# Patient Record
Sex: MALE | Race: WHITE | Employment: OTHER | URBAN - METROPOLITAN AREA
[De-identification: names, ages, dates, MRNs, and addresses within clinical notes are randomized per-mention and may not be internally consistent; named-entity substitution may affect disease eponyms.]

---

## 2018-06-05 PROBLEM — R91.8 LUNG MASS: Status: ACTIVE | Noted: 2018-06-05

## 2018-06-07 ENCOUNTER — APPOINTMENT (OUTPATIENT)
Dept: GENERAL RADIOLOGY | Age: 73
End: 2018-06-07
Attending: INTERNAL MEDICINE
Payer: MEDICARE

## 2018-06-07 ENCOUNTER — HOSPITAL ENCOUNTER (OUTPATIENT)
Age: 73
Setting detail: OUTPATIENT SURGERY
Discharge: HOME | End: 2018-06-07
Attending: INTERNAL MEDICINE | Admitting: INTERNAL MEDICINE
Payer: MEDICARE

## 2018-06-07 VITALS
WEIGHT: 158 LBS | TEMPERATURE: 97.9 F | BODY MASS INDEX: 24.8 KG/M2 | DIASTOLIC BLOOD PRESSURE: 80 MMHG | OXYGEN SATURATION: 94 % | HEART RATE: 77 BPM | RESPIRATION RATE: 18 BRPM | SYSTOLIC BLOOD PRESSURE: 138 MMHG | HEIGHT: 67 IN

## 2018-06-07 DIAGNOSIS — R91.8 LUNG MASS: ICD-10-CM

## 2018-06-07 PROCEDURE — 77030012699 HC VLV SUC CNTRL OCOA -A: Performed by: INTERNAL MEDICINE

## 2018-06-07 PROCEDURE — 88112 CYTOPATH CELL ENHANCE TECH: CPT | Performed by: INTERNAL MEDICINE

## 2018-06-07 PROCEDURE — 77030021922 HC FCPS BIOP SD DISP SPDM -D: Performed by: INTERNAL MEDICINE

## 2018-06-07 PROCEDURE — 77030003406 HC NDL ASPIR BIOP OCOA -C: Performed by: INTERNAL MEDICINE

## 2018-06-07 PROCEDURE — 74011250636 HC RX REV CODE- 250/636: Performed by: INTERNAL MEDICINE

## 2018-06-07 PROCEDURE — 31627 NAVIGATIONAL BRONCHOSCOPY: CPT | Performed by: INTERNAL MEDICINE

## 2018-06-07 PROCEDURE — 88305 TISSUE EXAM BY PATHOLOGIST: CPT | Performed by: INTERNAL MEDICINE

## 2018-06-07 PROCEDURE — 77030028571 HC CATH ENDOBRNCH DISP BIOP KT SPMD -G1: Performed by: INTERNAL MEDICINE

## 2018-06-07 PROCEDURE — 77030031420 HC NDL TIP BRSH ASP SD SPDM -B: Performed by: INTERNAL MEDICINE

## 2018-06-07 PROCEDURE — 88185 FLOWCYTOMETRY/TC ADD-ON: CPT | Performed by: INTERNAL MEDICINE

## 2018-06-07 PROCEDURE — 31628 BRONCHOSCOPY/LUNG BX EACH: CPT | Performed by: INTERNAL MEDICINE

## 2018-06-07 PROCEDURE — 99152 MOD SED SAME PHYS/QHP 5/>YRS: CPT | Performed by: INTERNAL MEDICINE

## 2018-06-07 PROCEDURE — 88184 FLOWCYTOMETRY/ TC 1 MARKER: CPT | Performed by: INTERNAL MEDICINE

## 2018-06-07 PROCEDURE — 31654 BRONCH EBUS IVNTJ PERPH LES: CPT | Performed by: INTERNAL MEDICINE

## 2018-06-07 PROCEDURE — 77030009046 HC CATH BRNCH BLLN OCOA -B: Performed by: INTERNAL MEDICINE

## 2018-06-07 PROCEDURE — 74011250636 HC RX REV CODE- 250/636

## 2018-06-07 PROCEDURE — 76040000026: Performed by: INTERNAL MEDICINE

## 2018-06-07 PROCEDURE — 77030031404 HC PTCH SENS SD DISP SPDM -A: Performed by: INTERNAL MEDICINE

## 2018-06-07 PROCEDURE — 99153 MOD SED SAME PHYS/QHP EA: CPT | Performed by: INTERNAL MEDICINE

## 2018-06-07 RX ORDER — SODIUM CHLORIDE, SODIUM LACTATE, POTASSIUM CHLORIDE, CALCIUM CHLORIDE 600; 310; 30; 20 MG/100ML; MG/100ML; MG/100ML; MG/100ML
1000 INJECTION, SOLUTION INTRAVENOUS CONTINUOUS
Status: DISCONTINUED | OUTPATIENT
Start: 2018-06-07 | End: 2018-06-07 | Stop reason: HOSPADM

## 2018-06-07 RX ORDER — FENTANYL CITRATE 50 UG/ML
50 INJECTION, SOLUTION INTRAMUSCULAR; INTRAVENOUS
Status: DISCONTINUED | OUTPATIENT
Start: 2018-06-07 | End: 2018-06-07 | Stop reason: HOSPADM

## 2018-06-07 RX ORDER — MIDAZOLAM HYDROCHLORIDE 1 MG/ML
.25-5 INJECTION, SOLUTION INTRAMUSCULAR; INTRAVENOUS
Status: DISCONTINUED | OUTPATIENT
Start: 2018-06-07 | End: 2018-06-07 | Stop reason: HOSPADM

## 2018-06-07 RX ORDER — NALOXONE HYDROCHLORIDE 0.4 MG/ML
0.4 INJECTION, SOLUTION INTRAMUSCULAR; INTRAVENOUS; SUBCUTANEOUS
Status: DISCONTINUED | OUTPATIENT
Start: 2018-06-07 | End: 2018-06-07 | Stop reason: HOSPADM

## 2018-06-07 RX ORDER — FLUMAZENIL 0.1 MG/ML
0.2 INJECTION INTRAVENOUS
Status: DISCONTINUED | OUTPATIENT
Start: 2018-06-07 | End: 2018-06-07 | Stop reason: HOSPADM

## 2018-06-07 RX ORDER — SODIUM CHLORIDE 0.9 % (FLUSH) 0.9 %
5-10 SYRINGE (ML) INJECTION AS NEEDED
Status: DISCONTINUED | OUTPATIENT
Start: 2018-06-07 | End: 2018-06-07 | Stop reason: HOSPADM

## 2018-06-07 RX ORDER — SODIUM CHLORIDE 0.9 % (FLUSH) 0.9 %
5-10 SYRINGE (ML) INJECTION EVERY 8 HOURS
Status: DISCONTINUED | OUTPATIENT
Start: 2018-06-07 | End: 2018-06-07 | Stop reason: HOSPADM

## 2018-06-07 RX ADMIN — FENTANYL CITRATE 50 MCG: 50 INJECTION, SOLUTION INTRAMUSCULAR; INTRAVENOUS at 14:40

## 2018-06-07 RX ADMIN — MIDAZOLAM HYDROCHLORIDE 2 MG: 1 INJECTION, SOLUTION INTRAMUSCULAR; INTRAVENOUS at 14:39

## 2018-06-07 RX ADMIN — FENTANYL CITRATE 50 MCG: 50 INJECTION, SOLUTION INTRAMUSCULAR; INTRAVENOUS at 14:49

## 2018-06-07 RX ADMIN — SODIUM CHLORIDE, SODIUM LACTATE, POTASSIUM CHLORIDE, AND CALCIUM CHLORIDE 1000 ML: 600; 310; 30; 20 INJECTION, SOLUTION INTRAVENOUS at 12:56

## 2018-06-07 NOTE — PROCEDURES
PROCEDURE  Bronchoscopy with Endobronchial Ultrasound Guided Fine Needle Aspiration Of Medistinal Lymph nodes and airway inspection and EMN aided biopsy of peripheral lung nodule. EQUIPMENT:  Olympus T180 bronchoscope, Super Dimension EMN system, 45 deg steareble sheath, Olympus [id number] EBUS scope, UM 17 Radial probe, Super D forceps, Fluoroscopy. INDICATION   Peripheral nodule/mass suspicious for malignancy/staging of presumed malignancy        POST OP DIAGNOSIS:  Super Dimension EMN system was employed to identify and biopsy the LLL nodule/mass seen on CT.  6 biopsies were obtained and evaluated via touch prep and TRIPP. Touch preps revealed malignant cells  IHC pending. Histology from obtained tissue is currently pending. BAL was performed and specimen sent for flow cytometry  There were no targets for EBUS    Based on CT chest/and EBUS pt is a STAGE [T1c,N0,Mx] IA3 as of now   Await final path and IHC    Patient will need CPFT and whole body PET CT and will be presented in MDTTB. He should be a good candidate for a lobectomy and lymphadenectomy. ANESTHESIA  Concious sedation with: Fentanyl 100 mcg IV; Versed 2 mg IV; Lidocaine 200 mg to tracheo-bronchial tree and vocal cords. AIRWAY INSPECTION  After obtaining informed consent, using a bite block, an Olympus T 180 viedeo bronchoscope was  introduced into the trachea through the vocal cords without complication.     RIGHT  LOCATION NORM/ABNORMAL DESCRIPTION   VOCAL CORDS NL    TRACHEA NL    ELVIA NL    RMSB NL    RUL NL    BI NL    RML NL    SUP SEGM RLL NL    MED BASAL NL    ANTERIOR BASAL NL    LATERAL BASAL NL    POSTERIOR BASAL NL                  LEFT  LOCATION NORMAL/ABNORMAL TYPE   LMSB NL    YANIV NL    LINGULA NL    SUPERIOR DIVISION NL    SUPERIOR SEG LLL NL    RUBY-MEDIAL LLL NL    LATERAL LLL NL    POSTERIOR LLL NL                         EBUS  After completing the airway inspection an Olympus  F EBUS bronchoscope was introduced into the trachea through the vocal chords without complication. The balloon was inflated with saline and a mediastinal inspection commenced:      STATION SIZE IN CM   11R sup No tgt   11R inf No tgt   10R No tgt   7 No tgt   4R No tgt   11L No tgt   10L No tgt   4L 0.1/0.2   2L No tgt   2R No tgt         There were no targets for biopsy        © 2018 UpToDate, Inc. and/or its affiliates. All Rights Reserved. T, N, and M descriptors for the eighth edition of TNM classification for lung cancer    T: Primary tumor   Tx Primary tumor cannot be assessed or tumor proven by presence of malignant cells in sputum or bronchial washings but not visualized by imaging or bronchoscopy   T0 No evidence of primary tumor   Tis Carcinoma in situ   T1 Tumor ? 3 cm in greatest dimension surrounded by lung or visceral pleura without bronchoscopic evidence of invasion more proximal than the lobar bronchus (ie, not in the main bronchus)*   T1a(mi) Minimally invasive adenocarcinoma¶   T1a Tumor ? 1 cm in greatest dimension*   T1b Tumor >1 cm but ?2 cm in greatest dimension*   T1c Tumor >2 cm but ?3 cm in greatest dimension*   T2 Tumor >3 cm but ?5 cm or tumor with any of the following features:?  · Involves main bronchus regardless of distance from the valarie but without involvement of the valarie  · Invades visceral pleura  · Associated with atelectasis or obstructive pneumonitis that extends to the hilar region, involving part or all of the lung   T2a Tumor >3 cm but ?4 cm in greatest dimension   T2b Tumor >4 cm but ?5 cm in greatest dimension   T3 Tumor >5 cm but ?7 cm in greatest dimension or associated with separate tumor nodule(s) in the same lobe as the primary tumor or directly invades any of the following structures: chest wall (including the parietal pleura and superior sulcus tumors), phrenic nerve, parietal pericardium   T4 Tumor >7 cm in greatest dimension or associated with separate tumor nodule(s) in a different ipsilateral lobe than that of the primary tumor or invades any of the following structures: diaphragm, mediastinum, heart, great vessels, trachea, recurrent laryngeal nerve, esophagus, vertebral body, and valarie   N: Regional lymph node involvement   Nx Regional lymph nodes cannot be assessed   N0 No regional lymph node metastasis   N1 Metastasis in ipsilateral peribronchial and/or ipsilateral hilar lymph nodes and intrapulmonary nodes, including involvement by direct extension   N2 Metastasis in ipsilateral mediastinal and/or subcarinal lymph node(s)   N3 Metastasis in contralateral mediastinal, contralateral hilar, ipsilateral or contralateral scalene, or supraclavicular lymph node(s)   M: Distant metastasis   M0 No distant metastasis   M1 Distant metastasis present   M1a Separate tumor nodule(s) in a contralateral lobe; tumor with pleural or pericardial nodule(s) or malignant pleural or pericardial effusion? M1b Single extrathoracic metastasis§   M1c Multiple extrathoracic metastases in one or more organs   Stage groupings   Occult carcinoma TX N0 M0   Stage 0 Tis N0 M0   Stage IA1 T1a(mi) N0 M0    T1a N0 M0   Stage IA2 T1b N0 M0   Stage IA3 T1c N0 M0   Stage IB T2a N0 M0   Stage IIA T2b N0 M0   Stage IIB T1a to c N1 M0    T2a N1 M0    T2b N1 M0    T3 N0 M0   Stage IIIA T1a to c N2 M0    T2a to b N2 M0    T3 N1 M0    T4 N0 M0    T4 N1 M0   Stage IIIB T1a to c N3 M0    T2a to b N3 M0    T3 N2 M0    T4 N2 M0   Stage IIIC T3 N3 M0    T4 N3 M0   Stage SOFÍA Any T Any N M1a    Any T Any N M1b   Stage IVB Any T Any N M1c   NOTE: Changes to the seventh edition are in bold. TNM: tumor, node, metastasis; Tis: carcinoma in situ; T1a(mi): minimally invasive adenocarcinoma. * The uncommon superficial spreading tumor of any size with its invasive component limited to the bronchial wall, which may extend proximal to the main bronchus, is also classified as T1a.   ¶ Solitary adenocarcinoma, ?3 cm with a predominately lepidic pattern and ?5 mm invasion in any one focus. ? T2 tumors with these features are classified as T2a if ?4 cm in greatest dimension or if size cannot be determined, and T2b if >4 cm but ?5 cm in greatest dimension. ? Most pleural (pericardial) effusions with lung cancer are due to tumor. In a few patients, however, multiple microscopic examinations of pleural (pericardial) fluid are negative for tumor and the fluid is nonbloody and not an exudate. When these elements and clinical judgment dictate that the effusion is not related to the tumor, the effusion should be excluded as a staging descriptor. § This includes involvement of a single distant (nonregional) lymph node. Reproduced from: Mateus Chandra et al. The IASLC Lung Cancer Staging Project: Proposals for Revision of the TNM Stage Groupings in the Forthcoming (Eighth) Edition of the TNM Classification for Lung Cancer. J Thorac Oncol 2016; 11:39. Table used with the permission of [employer]. All rights reserved. Graphic 727086 Version 1.0                                         Olympus T 180   bronchoscope wasintroduced into the airways to identify and biopsy the LLL mass/nodule with the aid of Super D EMN system and radial probe US. CT image was acquired on with Super D protocol were used and the pathway to target  planned using super D planning software. Planing data was then used to navigate to the nodule, after verification with radial US:    6 biopsies were obtained and evaluated via touch prep and TRIPP. Touch preps revealed malignant cells  IHC pending. Histology from obtained tissue is currently pending.   BAL was performed and specimen sent for flow cytometry  TOUCH PREP BIOPSY# MALIGNANT SUSPICIOUS ATYPIA NONDGNSTC   LLL forceps biopsy 1 ++++ - -     2 ++++ - -     3 ++++ - -     4 ++++ - -     5 - - - BE and blood    6 - - - BE and blood     EBL: 5 ml    Complications: NONE with no evidence of pneumothorax on post-op flouroscopy.     Elizabeth Lux MD.

## 2018-06-07 NOTE — H&P (VIEW-ONLY)
[address] [facility], [address] [facility]  [phone]    Patient Name:  Erik Sotomayor  YOB: 1945      Office Visit 6/5/2018    CHIEF COMPLAINT:    Chief Complaint   Patient presents with    Lung Mass       HISTORY OF PRESENT ILLNESS:    A very pleasant 82-year-old presents for incidentally found 3 cm lung mass in the left lower lobe identified during calcium scoring CT scan of the chest.  He is completely asymptomatic. He used to smoke a pack per day for 1 year exactly and quit in 1970. He does not have any family history of lung cancer and study has a history of prostate cancer in his family but no personal history of cancer in the past 5 years. He is a  currently retired. Denies any hemoptysis, unintentional weight loss, chest pain. After his calcium scoring CT scan was abnormal he was scheduled for a full chest CT scan which revealed no mediastinal adenopathy but a lobulated 3 cm left lower lobe mass for which he is presenting here. Otherwise relatively healthy, does not have any chronic problems he takes an aspirin a day and denies any medical problems chronically. No past medical history on file. Problem List  Never Reviewed          Codes Class Noted    Lung mass ICD-10-CM: R91.8  ICD-9-CM: 786.6  6/5/2018                No past surgical history on file. Social History     Social History    Marital status: UNKNOWN     Spouse name: N/A    Number of children: N/A    Years of education: N/A     Occupational History    Not on file.      Social History Main Topics    Smoking status: Former Smoker     Packs/day: 0.25     Years: 1.00     Types: Cigarettes     Quit date: 1/1/1968    Smokeless tobacco: Never Used    Alcohol use Not on file    Drug use: Not on file    Sexual activity: Not on file     Other Topics Concern    Not on file     Social History Narrative    No narrative on file         No family history on file. Allergies   Allergen Reactions    Oxycodone-Acetaminophen Itching    Sulfa (Sulfonamide Antibiotics) Itching    Sulfasalazine Other (comments)    Tyloxapol Other (comments)         Current Outpatient Prescriptions   Medication Sig    simvastatin (ZOCOR) 40 mg tablet Take  by mouth nightly.  dutasteride (AVODART) 0.5 mg capsule Take 0.5 mg by mouth daily.  ubidecarenone/vitamin E mixed (COQ10  PO) Take 1 Tab by mouth daily.  Omega-3 Fatty Acids 60- mg cpDR Take 1 Tab by mouth daily.  alfuzosin SR (UROXATRAL) 10 mg SR tablet Take 10 mg by mouth daily.  folic acid (FOLVITE) 1 mg tablet Take  by mouth daily.  cholecalciferol, vitamin D3, (VITAMIN D3) 2,000 unit tab Take 1 Tab by mouth daily.  aspirin delayed-release 81 mg tablet Take 81 mg by mouth daily.  cpap machine kit     doxepin (SINEQUAN) 10 mg/mL solution Take 10 mg by mouth nightly. No current facility-administered medications for this visit.             REVIEW OF SYSTEMS:       General:   Negative for unexplained weight loss / Weight loss / Weight gain / Fever / Chills / Fatigue / Night sweats    Eyes:Dryness  Negative for Vision loss / Blurred vision / Double vision / Pain / Redness /       Ears: Ringing  Negative for Hearing loss / / Pain    Nose:Postnasal drainage  / Nasal congestion  Negative for / Bleeding    Mouth/Throat: Snoring  Negative for Sore throat / Uclers / Bleeding gums / Hoarseness /    Neck:  Negative for Neck stiffness / Pain / Goiter / Mass     Respiratory: Sleep apnea  Negative for Shortness of breath / Cough / Wheezing / Coughing up blood /    Cardiovascular:  Negative for Chest pain / Palpitations / Sleep on multiple pillows / Sudden shortness of breath during sleep / History of heart murmur / Passing out / Swelling in legs or feet    Gastrointestinal:  Negative for Indigestion / Reflux / Nausea / Vomiting / Diarrhea / Constipation / Difficulty swallowing / Choking with eating or drining / Blood in stools / Loss of appetite / Abdominal pain    Genitourinary:Frequent urination / Frequent urination at night  Negative for Pain with urination /  / Blood in urine    Musculoskeletal: Back pain  Negative for Pain in joints / Pain in muscles / Weakness in arms or legs /    Neurological:  Negative for Seizures / Severe headaches / Dizziness / Numbness / Tremors / Memory loss / Morning headaches    Psychiatric:Anxiety  Negative for  / Depression / Suicidal thoughts or plans / Hallucinations    Allergies:Seasonal allergies itchy eyes / Nasal congestion or drainage    Negative for  / Watery  Skin:  Negative for Rash / Lesions / Itching / Hair loss / Dry skin    Hematology/ Lymph:  Negative for Bruise easily / Anemia / Swollen or tender glands / Blood clots       PHYSICAL EXAM:    Visit Vitals    /80 (BP 1 Location: Left arm, BP Patient Position: Sitting)    Pulse 76    Temp 98 °F (36.7 °C)    Resp 20    Ht 5' 6.5\" (1.689 m)    Wt 158 lb (71.7 kg)    SpO2 100%    BMI 25.12 kg/m2        GENERAL APPEARANCE:   The patient is normal weight and in no respiratory distress. HEENT:   PERRL. Conjunctivae unremarkable. Nasal mucosa is without epistaxis, exudate, or polyps. Gums and dentition are unremarkable. There is no oropharyngeal narrowing. TMs are clear. NECK/LYMPHATIC:   Symmetrical with no elevation of jugular venous pulsation. Trachea midline. No thyroid enlargement. No cervical adenopathy. LUNGS:   Normal respiratory effort with symmetrical lung expansion. Breath sounds clear to auscultation bilaterally with no rhonchi wheezing or crackles. HEART:   There is a regular rate and rhythm. No murmur, rub, or gallop. There is no edema in the lower extremities. ABDOMEN:   Soft and non-tender. No hepatosplenomegaly. Bowel sounds are normal.     SKIN:   There are no rashes, cyanosis, jaundice, or ecchymosis present.    EXTREMITIES:   The extremities are unremarkable without clubbing, cyanosis, joint inflammation, degenerative, or ischemic change. MUSCULOSKELETAL:   There is no abnormal tone, muscle atrophy, or abnormal movement present. NEURO:   The patient is alert and oriented to person, place, and time. Memory appears intact and mood is normal.  No gross sensorimotor deficits are present. DIAGNOSTIC TESTS:   CT of chest:           Spirometry:  6/5/2018          ASSESSMENT:  (Medical Decision Making)       Patient Active Problem List   Diagnosis Code    Lung mass R91.8           PLAN:  Encounter Diagnosis   Name Primary?  Lung mass Yes   Patient has a 3 cm lung mass in the left lower lobe, he is a non-smoker and only smoked for 1 year in the 1970. He has no stigmata of lung cancer the nodule is lobulated nevertheless using the spiculated pulmonary nodule calculator for risk of lung cancer he comes out to have a half of a risk of estimated 43%. I discussed this with him into length. I suspect that because there is growth, the patient had a calcium scoring CT scan of the chest 3 years ago at which point these abnormality was not noted. Therefore the apparent 3 cm mass has grown over the past 3 years. Given this I think it is reasonable to pursue biopsy for diagnosis. I discussed 3 options for biopsy including video-assisted thoracoscopic surgery with diagnostic wedge followed by completion lobectomy if cancer identified, CT-guided biopsy as well as navigation bronchoscopy with the bronchial guided fine-needle aspiration of mediastinal lymph nodes for mediastinal inspection and staging. Risks and benefits of the procedures were described to the patient and he finally opted on pursuing navigation bronchoscopy with concomitant endobronchial ultrasound-guided fine-needle aspiration and mediastinal staging at the same time.   He will be scheduled first available for the procedure, if we obtain a diagnosis of cancer than he would be a candidate for lobectomy with lymphadenectomy as he has normal lung function. To confirm this we will obtain complete pulmonary function with diffusion capacity for completeness sake. If the biopsy is negative then his case will be discussed in multidisciplinary thoracic tumor board perhaps a PET scan will be performed to determine whether PET positivity is present in the area to aid us in deciding whether to pursue surgical intervention or not. Any questions asked were answered seemingly to his and his wife's satisfaction who accompanied him to today's procedure. We will schedule tentatively navigation bronchoscopy for 14 June with Dr. Ayla Peters. The office will be determined after results of procedure. Orders Placed This Encounter    AMB POC SPIROMETRY    AMB POC PLETHYSMOGRAPHY LUNG VOLUMES W/WO AIRWAY RESIST ([id number])    AMB POC SPIROMETRY W/BRONCHODILATOR ([id number])    AMB POC SPIROMETRY ([id number])    AMB POC DIFFUSING CAPACITY ([id number])    AMB POC GAS DILUTION(LUNG VOLUMES) ([id number])    INHAL RX, AIRWAY OBST/DX SPUTUM INDUCT ([id number])            Braulio Paulino MD    Dictated using voice recognition software.  Proofread, but unrecognized voice recognition errors may exist.

## 2018-06-07 NOTE — IP AVS SNAPSHOT
[address] 
[phone] Patient: Carmen Lira MRN: [id number] :1945 About your hospitalization You were admitted on:  2018 You last received care in the:  [facility] You were discharged on:  2018 Why you were hospitalized Your primary diagnosis was:  Not on File Follow-up Information None Discharge Orders None A check tanna indicates which time of day the medication should be taken. My Medications ASK your doctor about these medications Instructions Each Dose to Equal  
 Morning Noon Evening Bedtime  
 alfuzosin SR 10 mg SR tablet Commonly known as:  Justinkarmen Torres Your last dose was: Your next dose is: Take 10 mg by mouth daily. 10 mg  
    
   
   
   
  
 aspirin delayed-release 81 mg tablet Your last dose was: Your next dose is: Take 81 mg by mouth daily. 81 mg  
    
   
   
   
  
 COQ10  PO Your last dose was: Your next dose is: Take 1 Tab by mouth daily. 1 Tab  
    
   
   
   
  
 cpap machine kit Your last dose was: Your next dose is:    
   
   
      
   
   
   
  
 doxepin 10 mg/mL solution Commonly known as:  SINEQUAN Your last dose was: Your next dose is: Take 10 mg by mouth nightly. 10 mg  
    
   
   
   
  
 dutasteride 0.5 mg capsule Commonly known as:  AVODART Your last dose was: Your next dose is: Take 0.5 mg by mouth daily. 0.5 mg  
    
   
   
   
  
 folic acid 1 mg tablet Commonly known as:  [employer] Your last dose was: Your next dose is: Take  by mouth daily. Omega-3 Fatty Acids 60- mg Cpdr  
   
Your last dose was: Your next dose is: Take 1 Tab by mouth daily. 1 Tab  
    
   
   
   
  
 simvastatin 40 mg tablet Commonly known as:  ZOCOR Your last dose was: Your next dose is: Take  by mouth nightly. VITAMIN D3 2,000 unit Tab Generic drug:  cholecalciferol (vitamin D3) Your last dose was: Your next dose is: Take 1 Tab by mouth daily. 1 Tab Discharge Instructions RESPIRATORY CARE - BRONCHOSCOPY - DISCHARGE INSTRUCTIONS You received a lot of numbing medication for your throat and nose, and you also received medication to make you sleepy during your procedure. Because of this and because the bronchoscopy may have irritated your airways, we ask that you follow these directions: 1. Do not eat or drink until  4:30pm .   After that, you may have what you please. You may want to try some liquids first, because your throat may be a little sore. 2. Medication may cause drowsiness for several hours, therefore, do not drive or operate machinery for remainder of the day. No alcohol today. 3. You may cough up more mucus than usual and you may see some blood, but this is expected and should subside by the following day. 4. If severe throat irritation, coughing, or bleeding continue, call your doctor. 5.         If you run a fever greater than 102, call [facility] at [phone]. 6.         Dr. Estephania Lopes has asked that you: A. Call the doctor's office at [phone], to schedule PET scan. Instructions given to Dilan Biggs and other family members Introducing [facility]! Dear Morteza Leon: Thank you for requesting a [employer] account. Our records indicate that you already have an active [employer] account. You can access your account anytime at [email or web address] Did you know that you can access your hospital and ER discharge instructions at any time in Targazymehart? You can also review all of your test results from your hospital stay or ER visit. Additional Information If you have questions, please visit the Frequently Asked Questions section of the [employer] website at [email or web address] Mail [email or web address]/. Remember, [employer] is NOT to be used for urgent needs. For medical emergencies, dial 911. Now available from your iPhone and Android! Introducing Catalino Tellez As a [employer] patient, I wanted to make you aware of our electronic visit tool called Catalino Tellez. [employer] 24/7 allows you to connect within minutes with a medical provider 24 hours a day, seven days a week via a mobile device or tablet or logging into a secure website from your computer. You can access Catalino Tellez from anywhere in the [place]. A virtual visit might be right for you when you have a simple condition and feel like you just dont want to get out of bed, or cant get away from work for an appointment, when your regular [employer] provider is not available (evenings, weekends or holidays), or when youre out of town and need minor care. Electronic visits cost only $49 and if the [employer] 24/7 provider determines a prescription is needed to treat your condition, one can be electronically transmitted to a nearby pharmacy*. Please take a moment to enroll today if you have not already done so. The enrollment process is free and takes just a few minutes. To enroll, please download the [employer] 24/7 blanca to your tablet or phone, or visit [email or web address] to enroll on your computer. And, as an [address] [place] [address] patient with a [employer] account, the results of your visits will be scanned into your electronic medical record and your primary care provider will be able to view the scanned results.    
We urge you to continue to see your regular [employer] provider for your ongoing medical care. And while your primary care provider may not be the one available when you seek a Catalino Tellez virtual visit, the peace of mind you get from getting a real diagnosis real time can be priceless. For more information on Catalino Robtrung, view our Frequently Asked Questions (FAQs) at [email or web address]. Sincerely, 
 
Jose Luis Sánchez MD 
Chief Medical Officer Mathew Lantigua *:  certain medications cannot be prescribed via Catalino Tellez Providers Seen During Your Hospitalization Provider Specialty Primary office phone Shanice Medina MD Pulmonary Disease [phone] Your Primary Care Physician (PCP) Primary Care Physician Office Phone Office Fax OTHER, PHYS ** None ** ** None ** You are allergic to the following Allergen Reactions Oxycodone-Acetaminophen Itching Sulfa (Sulfonamide Antibiotics) Itching Sulfasalazine Other (comments) Tyloxapol Other (comments) Recent Documentation Height Weight BMI Smoking Status 1.689 m 71.7 kg 25.12 kg/m2 Former Smoker Emergency Contacts Name Discharge Info Relation Home Work Mobile Arely Suazo  Spouse [3] [phone] Patient Belongings The following personal items are in your possession at time of discharge: 
  Dental Appliances: None  Visual Aid: Glasses Please provide this summary of care documentation to your next provider. Signatures-by signing, you are acknowledging that this After Visit Summary has been reviewed with you and you have received a copy. Patient Signature:  ____________________________________________________________ Date:  ____________________________________________________________  
  
Priyanka Ruby Provider Signature:  ____________________________________________________________ Date:  ____________________________________________________________

## 2018-06-07 NOTE — DISCHARGE INSTRUCTIONS
RESPIRATORY CARE - BRONCHOSCOPY - DISCHARGE INSTRUCTIONS      You received a lot of numbing medication for your throat and nose, and you also received medication to make you sleepy during your procedure. Because of this and because the bronchoscopy may have irritated your airways, we ask that you follow these directions:    1. Do not eat or drink until  4:30pm .   After that, you may have what you please. You may want to try some liquids first, because your throat may be a little sore. 2. Medication may cause drowsiness for several hours, therefore, do not drive or operate machinery for remainder of the day. No alcohol today. 3. You may cough up more mucus than usual and you may see some blood, but this is expected and should subside by the following day. 4. If severe throat irritation, coughing, or bleeding continue, call your doctor. 5.         If you run a fever greater than 102, call [facility] at [phone]. 6.         Dr. Estephania Lopes has asked that you:                A. Call the doctor's office at [phone], to schedule PET scan.                              Instructions given to Dilan Biggs and other family members

## 2018-06-07 NOTE — INTERVAL H&P NOTE
H&P Update:  Luisa Yung was seen and examined. History and physical has been reviewed. The patient has been examined.  There have been no significant clinical changes since the completion of the originally dated History and Physical.    Signed By: Nicole Gilmore MD     June 7, 2018 2:37 PM

## 2018-06-07 NOTE — IP AVS SNAPSHOT
Summary of Care Report The Summary of Care report has been created to help improve care coordination. Users with access to [employer] or [address] (Web-based application) may access additional patient information including the Discharge Summary. If you are not currently a [address] user and need more information, please call the number listed below in the [place] [address] section and ask to be connected with Medical Records. Facility Information Name Address Phone [address] [address] [address] [phone] Patient Information Patient Name Sex  Laura Baez ([id number]) Male 1945 Discharge Information Admitting Provider Service Area Unit Gerardo Mtz MD / [address] Endoscopy / [phone] Discharge Provider Discharge Date/Time Discharge Disposition Destination (none) (none) (none) (none) Patient Language Language ENGLISH [13] Non-Hospital Problems as of 2018  Never Reviewed Class Noted - Resolved Last Modified Active Problems Lung mass  2018 - Present 2018 by Gerardo Mtz MD  
  Entered by Gerardo Mtz MD  
  
You are allergic to the following Allergen Reactions Oxycodone-Acetaminophen Itching Sulfa (Sulfonamide Antibiotics) Itching Sulfasalazine Other (comments) Tyloxapol Other (comments) Current Discharge Medication List  
  
ASK your doctor about these medications Dose & Instructions Dispensing Information Comments  
 alfuzosin SR 10 mg SR tablet Commonly known as:  Jaswant Livingston Dose:  10 mg Take 10 mg by mouth daily. Refills:  0  
   
 aspirin delayed-release 81 mg tablet Dose:  81 mg Take 81 mg by mouth daily. Refills:  0  
   
 COQ10  PO Dose:  1 Tab Take 1 Tab by mouth daily. Refills:  0  
   
 cpap machine kit Refills:  0  
   
 doxepin 10 mg/mL solution Commonly known as:  SINEQUAN Dose:  10 mg Take 10 mg by mouth nightly. Refills:  0  
   
 dutasteride 0.5 mg capsule Commonly known as:  AVODART Dose:  0.5 mg Take 0.5 mg by mouth daily. Refills:  0  
   
 folic acid 1 mg tablet Commonly known as:  Google Take  by mouth daily. Refills:  0 Omega-3 Fatty Acids 60- mg Cpdr  
 Dose:  1 Tab Take 1 Tab by mouth daily. Refills:  0  
   
 simvastatin 40 mg tablet Commonly known as:  ZOCOR Take  by mouth nightly. Refills:  0  
   
 VITAMIN D3 2,000 unit Tab Generic drug:  cholecalciferol (vitamin D3) Dose:  1 Tab Take 1 Tab by mouth daily. Refills:  0 Current Immunizations Name Date Influenza High Dose Vaccine PF 9/1/2017 Surgery Information ID Date/Time Status Primary Surgeon All Procedures Location [id number] 6/7/2018 [address] Priscilla Vail MD BRONCHOSCOPY WITH RADIAL PROBE 
ENDOSCOPIC BRONCHOSCOPY ULTRASOUND (EBUS) BRONCHOSCOPY WITH BIOPSY 
BRONCHIAL ALVEOLAR LAV[place] [facility] ENDOSCOPY Follow-up Information None Discharge Instructions RESPIRATORY CARE - BRONCHOSCOPY - DISCHARGE INSTRUCTIONS You received a lot of numbing medication for your throat and nose, and you also received medication to make you sleepy during your procedure. Because of this and because the bronchoscopy may have irritated your airways, we ask that you follow these directions: 1. Do not eat or drink until  4:30pm .   After that, you may have what you please. You may want to try some liquids first, because your throat may be a little sore. 2. Medication may cause drowsiness for several hours, therefore, do not drive or operate machinery for remainder of the day. No alcohol today. 3. You may cough up more mucus than usual and you may see some blood, but this is expected and should subside by the following day. 4. If severe throat irritation, coughing, or bleeding continue, call your doctor. 5.         If you run a fever greater than 102, call [facility] at [phone]. 6.         Dr. Sheila Valdez has asked that you: A. Call the doctor's office at [phone], to schedule PET scan. Instructions given to Ofilia Frankel and other family members Chart Review Routing History No Routing History on File

## 2018-06-14 ENCOUNTER — HOSPITAL ENCOUNTER (OUTPATIENT)
Dept: PET IMAGING | Age: 73
Discharge: HOME | End: 2018-06-14
Payer: MEDICARE

## 2018-06-14 DIAGNOSIS — J98.4 PULMONARY LESION: ICD-10-CM

## 2018-06-14 PROCEDURE — 74011636320 HC RX REV CODE- 636/320: Performed by: INTERNAL MEDICINE

## 2018-06-14 PROCEDURE — A9552 F18 FDG: HCPCS

## 2018-06-14 RX ORDER — SODIUM CHLORIDE 0.9 % (FLUSH) 0.9 %
5-10 SYRINGE (ML) INJECTION
Status: COMPLETED | OUTPATIENT
Start: 2018-06-14 | End: 2018-06-14

## 2018-06-14 RX ADMIN — Medication 10 ML: at 14:27

## 2018-06-14 RX ADMIN — DIATRIZOATE MEGLUMINE AND DIATRIZOATE SODIUM 10 ML: 660; 100 LIQUID ORAL; RECTAL at 14:45

## 2018-08-14 ENCOUNTER — HOSPITAL ENCOUNTER (OUTPATIENT)
Dept: CT IMAGING | Age: 73
Discharge: HOME | End: 2018-08-14
Attending: PHYSICIAN ASSISTANT
Payer: MEDICARE

## 2018-08-14 DIAGNOSIS — Z90.2 S/P LOBECTOMY OF LUNG: ICD-10-CM

## 2018-08-14 DIAGNOSIS — D3A.8 NEUROENDOCRINE TUMOR: ICD-10-CM

## 2018-08-14 PROCEDURE — 71250 CT THORAX DX C-: CPT

## 2019-04-15 ENCOUNTER — HOSPITAL ENCOUNTER (OUTPATIENT)
Dept: CT IMAGING | Age: 74
Discharge: HOME | End: 2019-04-15
Attending: INTERNAL MEDICINE
Payer: MEDICARE

## 2019-04-15 VITALS — HEIGHT: 66 IN | BODY MASS INDEX: 25.39 KG/M2 | WEIGHT: 158 LBS

## 2019-04-15 DIAGNOSIS — C7A.8 NEUROENDOCRINE CARCINOMA (HCC): ICD-10-CM

## 2019-04-15 LAB — CREAT BLD-MCNC: 1 MG/DL (ref 0.8–1.5)

## 2019-04-15 PROCEDURE — 74011000258 HC RX REV CODE- 258: Performed by: INTERNAL MEDICINE

## 2019-04-15 PROCEDURE — 82565 ASSAY OF CREATININE: CPT

## 2019-04-15 PROCEDURE — 74177 CT ABD & PELVIS W/CONTRAST: CPT

## 2019-04-15 PROCEDURE — 74011636320 HC RX REV CODE- 636/320: Performed by: INTERNAL MEDICINE

## 2019-04-15 RX ORDER — SODIUM CHLORIDE 0.9 % (FLUSH) 0.9 %
10 SYRINGE (ML) INJECTION
Status: COMPLETED | OUTPATIENT
Start: 2019-04-15 | End: 2019-04-15

## 2019-04-15 RX ADMIN — IOPAMIDOL 100 ML: 755 INJECTION, SOLUTION INTRAVENOUS at 12:49

## 2019-04-15 RX ADMIN — SODIUM CHLORIDE 100 ML: 900 INJECTION, SOLUTION INTRAVENOUS at 12:49

## 2019-04-15 RX ADMIN — DIATRIZOATE MEGLUMINE AND DIATRIZOATE SODIUM 15 ML: 660; 100 LIQUID ORAL; RECTAL at 12:49

## 2019-04-15 RX ADMIN — Medication 10 ML: at 12:49

## 2019-10-25 ENCOUNTER — HOSPITAL ENCOUNTER (OUTPATIENT)
Dept: CT IMAGING | Age: 74
Discharge: HOME | End: 2019-10-25
Attending: INTERNAL MEDICINE
Payer: MEDICARE

## 2019-10-25 VITALS — HEIGHT: 66 IN | WEIGHT: 160 LBS | BODY MASS INDEX: 25.71 KG/M2

## 2019-10-25 DIAGNOSIS — C7A.8 NEUROENDOCRINE CARCINOMA OF LUNG (HCC): ICD-10-CM

## 2019-10-25 LAB — CREAT BLD-MCNC: 1 MG/DL (ref 0.8–1.5)

## 2019-10-25 PROCEDURE — 82565 ASSAY OF CREATININE: CPT

## 2019-10-25 PROCEDURE — 74011000258 HC RX REV CODE- 258: Performed by: INTERNAL MEDICINE

## 2019-10-25 PROCEDURE — 74177 CT ABD & PELVIS W/CONTRAST: CPT

## 2019-10-25 PROCEDURE — 74011636320 HC RX REV CODE- 636/320: Performed by: INTERNAL MEDICINE

## 2019-10-25 RX ORDER — SODIUM CHLORIDE 0.9 % (FLUSH) 0.9 %
10 SYRINGE (ML) INJECTION
Status: COMPLETED | OUTPATIENT
Start: 2019-10-25 | End: 2019-10-25

## 2019-10-25 RX ADMIN — SODIUM CHLORIDE 100 ML: 900 INJECTION, SOLUTION INTRAVENOUS at 10:36

## 2019-10-25 RX ADMIN — Medication 10 ML: at 10:36

## 2019-10-25 RX ADMIN — IOPAMIDOL 100 ML: 755 INJECTION, SOLUTION INTRAVENOUS at 10:36

## 2019-10-25 RX ADMIN — DIATRIZOATE MEGLUMINE AND DIATRIZOATE SODIUM 15 ML: 660; 100 LIQUID ORAL; RECTAL at 10:36

## 2020-04-27 ENCOUNTER — HOSPITAL ENCOUNTER (OUTPATIENT)
Dept: CT IMAGING | Age: 75
Discharge: HOME | End: 2020-04-27
Attending: INTERNAL MEDICINE
Payer: MEDICARE

## 2020-04-27 DIAGNOSIS — C7A.8 NEUROENDOCRINE CARCINOMA OF LUNG (HCC): ICD-10-CM

## 2020-04-27 LAB — CREAT BLD-MCNC: 1 MG/DL (ref 0.8–1.5)

## 2020-04-27 PROCEDURE — 82565 ASSAY OF CREATININE: CPT

## 2020-04-27 PROCEDURE — 74011636320 HC RX REV CODE- 636/320: Performed by: INTERNAL MEDICINE

## 2020-04-27 PROCEDURE — 74177 CT ABD & PELVIS W/CONTRAST: CPT

## 2020-04-27 PROCEDURE — 74011000258 HC RX REV CODE- 258: Performed by: INTERNAL MEDICINE

## 2020-04-27 RX ORDER — SODIUM CHLORIDE 0.9 % (FLUSH) 0.9 %
10 SYRINGE (ML) INJECTION
Status: COMPLETED | OUTPATIENT
Start: 2020-04-27 | End: 2020-04-27

## 2020-04-27 RX ADMIN — IOPAMIDOL 100 ML: 755 INJECTION, SOLUTION INTRAVENOUS at 10:59

## 2020-04-27 RX ADMIN — SODIUM CHLORIDE 100 ML: 900 INJECTION, SOLUTION INTRAVENOUS at 10:59

## 2020-04-27 RX ADMIN — DIATRIZOATE MEGLUMINE AND DIATRIZOATE SODIUM 15 ML: 660; 100 LIQUID ORAL; RECTAL at 10:59

## 2020-04-27 RX ADMIN — Medication 10 ML: at 10:59

## 2020-06-08 ENCOUNTER — HOSPITAL ENCOUNTER (OUTPATIENT)
Dept: CT IMAGING | Age: 75
Discharge: HOME | End: 2020-06-08
Attending: INTERNAL MEDICINE
Payer: MEDICARE

## 2020-06-08 VITALS — HEIGHT: 66 IN | BODY MASS INDEX: 23.46 KG/M2 | WEIGHT: 146 LBS

## 2020-06-08 DIAGNOSIS — R59.9 ADENOPATHY: ICD-10-CM

## 2020-06-08 LAB — CREAT BLD-MCNC: 0.9 MG/DL (ref 0.8–1.5)

## 2020-06-08 PROCEDURE — 74011000258 HC RX REV CODE- 258: Performed by: INTERNAL MEDICINE

## 2020-06-08 PROCEDURE — 74011636320 HC RX REV CODE- 636/320: Performed by: INTERNAL MEDICINE

## 2020-06-08 PROCEDURE — 74177 CT ABD & PELVIS W/CONTRAST: CPT

## 2020-06-08 PROCEDURE — 82565 ASSAY OF CREATININE: CPT

## 2020-06-08 RX ORDER — SODIUM CHLORIDE 0.9 % (FLUSH) 0.9 %
10 SYRINGE (ML) INJECTION
Status: COMPLETED | OUTPATIENT
Start: 2020-06-08 | End: 2020-06-08

## 2020-06-08 RX ADMIN — Medication 10 ML: at 15:33

## 2020-06-08 RX ADMIN — IOPAMIDOL 100 ML: 755 INJECTION, SOLUTION INTRAVENOUS at 15:32

## 2020-06-08 RX ADMIN — DIATRIZOATE MEGLUMINE AND DIATRIZOATE SODIUM 15 ML: 660; 100 LIQUID ORAL; RECTAL at 15:33

## 2020-06-08 RX ADMIN — SODIUM CHLORIDE 100 ML: 900 INJECTION, SOLUTION INTRAVENOUS at 15:33

## 2020-06-12 ENCOUNTER — HOSPITAL ENCOUNTER (OUTPATIENT)
Dept: LAB | Age: 75
Discharge: HOME | End: 2020-06-12
Payer: MEDICARE

## 2020-06-12 DIAGNOSIS — C82.80 OTHER TYPE OF FOLLICULAR LYMPHOMA, UNSPECIFIED BODY REGION (HCC): ICD-10-CM

## 2020-06-12 LAB
B2 MICROGLOB SERPL-MCNC: 1.8 MG/L (ref 0.8–2.34)
HBV SURFACE AB SERPL IA-ACNC: <3.1 MIU/ML
LDH SERPL L TO P-CCNC: 154 U/L (ref 110–210)

## 2020-06-12 PROCEDURE — 36415 COLL VENOUS BLD VENIPUNCTURE: CPT

## 2020-06-12 PROCEDURE — 86706 HEP B SURFACE ANTIBODY: CPT

## 2020-06-12 PROCEDURE — 83615 LACTATE (LD) (LDH) ENZYME: CPT

## 2020-06-12 PROCEDURE — 86704 HEP B CORE ANTIBODY TOTAL: CPT

## 2020-06-12 PROCEDURE — 86803 HEPATITIS C AB TEST: CPT

## 2020-06-12 PROCEDURE — 82232 ASSAY OF BETA-2 PROTEIN: CPT

## 2020-06-12 PROCEDURE — 87389 HIV-1 AG W/HIV-1&-2 AB AG IA: CPT

## 2020-06-13 LAB
HBV CORE AB SERPL QL IA: NEGATIVE
HCV AB S/CO SERPL IA: <0.1 S/CO RATIO (ref 0–0.9)
HIV 1+2 AB+HIV1 P24 AG SERPL QL IA: NON REACTIVE

## 2020-12-01 ENCOUNTER — HOSPITAL ENCOUNTER (OUTPATIENT)
Dept: CT IMAGING | Age: 75
Discharge: HOME | End: 2020-12-01
Attending: INTERNAL MEDICINE
Payer: MEDICARE

## 2020-12-01 DIAGNOSIS — R59.9 ADENOPATHY: ICD-10-CM

## 2020-12-01 LAB — CREAT BLD-MCNC: 1 MG/DL (ref 0.8–1.5)

## 2020-12-01 PROCEDURE — 74011000636 HC RX REV CODE- 636: Performed by: INTERNAL MEDICINE

## 2020-12-01 PROCEDURE — 74177 CT ABD & PELVIS W/CONTRAST: CPT

## 2020-12-01 PROCEDURE — 74011000258 HC RX REV CODE- 258: Performed by: INTERNAL MEDICINE

## 2020-12-01 PROCEDURE — 82565 ASSAY OF CREATININE: CPT

## 2020-12-01 RX ORDER — SODIUM CHLORIDE 0.9 % (FLUSH) 0.9 %
10 SYRINGE (ML) INJECTION
Status: COMPLETED | OUTPATIENT
Start: 2020-12-01 | End: 2020-12-01

## 2020-12-01 RX ADMIN — IOPAMIDOL 100 ML: 755 INJECTION, SOLUTION INTRAVENOUS at 09:25

## 2020-12-01 RX ADMIN — SODIUM CHLORIDE 100 ML: 900 INJECTION, SOLUTION INTRAVENOUS at 09:25

## 2020-12-01 RX ADMIN — DIATRIZOATE MEGLUMINE AND DIATRIZOATE SODIUM 15 ML: 660; 100 LIQUID ORAL; RECTAL at 09:26

## 2020-12-01 RX ADMIN — Medication 10 ML: at 09:25

## 2021-06-02 ENCOUNTER — HOSPITAL ENCOUNTER (OUTPATIENT)
Dept: CT IMAGING | Age: 76
Discharge: HOME | End: 2021-06-02
Attending: INTERNAL MEDICINE
Payer: MEDICARE

## 2021-06-02 DIAGNOSIS — C7A.8 NEUROENDOCRINE CANCER (HCC): ICD-10-CM

## 2021-06-02 DIAGNOSIS — C85.90 LYMPHOMA, UNSPECIFIED BODY REGION, UNSPECIFIED LYMPHOMA TYPE (HCC): ICD-10-CM

## 2021-06-02 LAB — CREAT BLD-MCNC: 0.92 MG/DL (ref 0.8–1.5)

## 2021-06-02 PROCEDURE — 82565 ASSAY OF CREATININE: CPT

## 2021-06-02 PROCEDURE — 74011000636 HC RX REV CODE- 636: Performed by: INTERNAL MEDICINE

## 2021-06-02 PROCEDURE — 74011000258 HC RX REV CODE- 258: Performed by: INTERNAL MEDICINE

## 2021-06-02 PROCEDURE — 74177 CT ABD & PELVIS W/CONTRAST: CPT

## 2021-06-02 RX ORDER — SODIUM CHLORIDE 0.9 % (FLUSH) 0.9 %
10 SYRINGE (ML) INJECTION
Status: COMPLETED | OUTPATIENT
Start: 2021-06-02 | End: 2021-06-02

## 2021-06-02 RX ADMIN — DIATRIZOATE MEGLUMINE AND DIATRIZOATE SODIUM 15 ML: 660; 100 LIQUID ORAL; RECTAL at 10:59

## 2021-06-02 RX ADMIN — IOPAMIDOL 100 ML: 755 INJECTION, SOLUTION INTRAVENOUS at 10:58

## 2021-06-02 RX ADMIN — SODIUM CHLORIDE 100 ML: 900 INJECTION, SOLUTION INTRAVENOUS at 10:58

## 2021-06-02 RX ADMIN — Medication 10 ML: at 10:58

## 2021-10-22 ENCOUNTER — APPOINTMENT (RX ONLY)
Dept: URBAN - METROPOLITAN AREA CLINIC 23 | Facility: CLINIC | Age: 76
Setting detail: DERMATOLOGY
End: 2021-10-22

## 2021-10-22 DIAGNOSIS — L57.8 OTHER SKIN CHANGES DUE TO CHRONIC EXPOSURE TO NONIONIZING RADIATION: ICD-10-CM

## 2021-10-22 DIAGNOSIS — L82.0 INFLAMED SEBORRHEIC KERATOSIS: ICD-10-CM

## 2021-10-22 DIAGNOSIS — L82.1 OTHER SEBORRHEIC KERATOSIS: ICD-10-CM

## 2021-10-22 DIAGNOSIS — Z71.89 OTHER SPECIFIED COUNSELING: ICD-10-CM

## 2021-10-22 PROCEDURE — ? COUNSELING

## 2021-10-22 PROCEDURE — ? LIQUID NITROGEN

## 2021-10-22 PROCEDURE — 99203 OFFICE O/P NEW LOW 30 MIN: CPT | Mod: 25

## 2021-10-22 PROCEDURE — 17110 DESTRUCTION B9 LES UP TO 14: CPT

## 2021-10-22 ASSESSMENT — LOCATION DETAILED DESCRIPTION DERM
LOCATION DETAILED: RIGHT ANTECUBITAL SKIN
LOCATION DETAILED: MID POSTERIOR NECK
LOCATION DETAILED: RIGHT PROXIMAL DORSAL FOREARM
LOCATION DETAILED: RIGHT SUPERIOR MEDIAL MIDBACK
LOCATION DETAILED: LEFT PROXIMAL DORSAL FOREARM
LOCATION DETAILED: RIGHT INFERIOR UPPER BACK
LOCATION DETAILED: RIGHT AXILLARY VAULT
LOCATION DETAILED: EPIGASTRIC SKIN

## 2021-10-22 ASSESSMENT — LOCATION SIMPLE DESCRIPTION DERM
LOCATION SIMPLE: RIGHT UPPER BACK
LOCATION SIMPLE: RIGHT AXILLARY VAULT
LOCATION SIMPLE: POSTERIOR NECK
LOCATION SIMPLE: ABDOMEN
LOCATION SIMPLE: LEFT FOREARM
LOCATION SIMPLE: RIGHT LOWER BACK
LOCATION SIMPLE: RIGHT FOREARM
LOCATION SIMPLE: RIGHT ELBOW

## 2021-10-22 ASSESSMENT — LOCATION ZONE DERM
LOCATION ZONE: ARM
LOCATION ZONE: TRUNK
LOCATION ZONE: NECK
LOCATION ZONE: AXILLAE

## 2021-10-22 NOTE — PROCEDURE: LIQUID NITROGEN
Render Note In Bullet Format When Appropriate: No
Medical Necessity Information: It is in your best interest to select a reason for this procedure from the list below. All of these items fulfill various CMS LCD requirements except the new and changing color options.
Consent: The patient's consent was obtained including but not limited to risks of crusting, scabbing, blistering, scarring, darker or lighter pigmentary change, recurrence, incomplete removal and infection.
Show Applicator Variable?: Yes
Detail Level: Detailed
Medical Necessity Clause: This procedure was medically necessary because the lesions that were treated were:
Post-Care Instructions: I reviewed with the patient in detail post-care instructions. Patient is to wear sunprotection, and avoid picking at any of the treated lesions. Pt may apply Vaseline to crusted or scabbing areas.

## 2021-12-01 ENCOUNTER — HOSPITAL ENCOUNTER (OUTPATIENT)
Dept: CT IMAGING | Age: 76
Discharge: HOME | End: 2021-12-01
Attending: INTERNAL MEDICINE
Payer: MEDICARE

## 2021-12-01 DIAGNOSIS — C85.90 LYMPHOMA, UNSPECIFIED BODY REGION, UNSPECIFIED LYMPHOMA TYPE (HCC): ICD-10-CM

## 2021-12-01 DIAGNOSIS — C7A.8 NEUROENDOCRINE CANCER (HCC): ICD-10-CM

## 2021-12-01 LAB — CREAT BLD-MCNC: 0.85 MG/DL (ref 0.8–1.5)

## 2021-12-01 PROCEDURE — 74177 CT ABD & PELVIS W/CONTRAST: CPT

## 2021-12-01 PROCEDURE — 74011000258 HC RX REV CODE- 258: Performed by: INTERNAL MEDICINE

## 2021-12-01 PROCEDURE — 82565 ASSAY OF CREATININE: CPT

## 2021-12-01 PROCEDURE — 74011000636 HC RX REV CODE- 636: Performed by: INTERNAL MEDICINE

## 2021-12-01 RX ORDER — SODIUM CHLORIDE 0.9 % (FLUSH) 0.9 %
10 SYRINGE (ML) INJECTION
Status: COMPLETED | OUTPATIENT
Start: 2021-12-01 | End: 2021-12-01

## 2021-12-01 RX ADMIN — IOPAMIDOL 100 ML: 755 INJECTION, SOLUTION INTRAVENOUS at 10:47

## 2021-12-01 RX ADMIN — Medication 10 ML: at 10:48

## 2021-12-01 RX ADMIN — SODIUM CHLORIDE 100 ML: 900 INJECTION, SOLUTION INTRAVENOUS at 10:48

## 2021-12-01 RX ADMIN — DIATRIZOATE MEGLUMINE AND DIATRIZOATE SODIUM 15 ML: 660; 100 LIQUID ORAL; RECTAL at 10:48

## 2022-03-19 PROBLEM — R91.8 LUNG MASS: Status: ACTIVE | Noted: 2018-06-05

## 2022-03-20 PROBLEM — Z90.2 S/P LOBECTOMY OF LUNG: Status: ACTIVE | Noted: 2018-08-14

## 2022-03-28 ENCOUNTER — HOSPITAL ENCOUNTER (OUTPATIENT)
Dept: PHYSICAL THERAPY | Age: 77
Discharge: HOME | End: 2022-03-28
Payer: MEDICARE

## 2022-03-28 PROCEDURE — 97161 PT EVAL LOW COMPLEX 20 MIN: CPT

## 2022-03-28 PROCEDURE — 97110 THERAPEUTIC EXERCISES: CPT

## 2022-03-28 NOTE — THERAPY EVALUATION
Dann Alfonso  : 1945  [place] / [place] / Product Type: Medicare /  [address]  at [facility]  [address], [address], [place]  [phone]   [phone]             OUTPATIENT PHYSICAL THERAPY:Initial Assessment 3/28/2022   ICD-10: Treatment Diagnosis:  Low back pain (M54.5), Pain in right shoulder (M25.511)  Precautions/Allergies:   Oxycodone-acetaminophen, Sulfa (sulfonamide antibiotics), Sulfasalazine, and Tyloxapol   TREATMENT PLAN:  Effective Dates: 3/28/2022 TO 2022 (90 days). Frequency/Duration: 2 times a week for 90 Day(s) MEDICAL/REFERRING DIAGNOSIS:  Pain in right shoulder [M25.511]  Low back pain, unspecified [M54.50]   DATE OF ONSET: chronic  REFERRING PHYSICIAN: Joaquin Pardo MD MD Orders: evaluate and treat  Return MD Appointment: --     INITIAL ASSESSMENT:  Mr. Johnny Dang presents with R shoulder pain and low back pain and associated functional and movement deficits. Pt will benefit from skilled physical therapy to address pain and dysfunctions. PROBLEM LIST (Impacting functional limitations):  1. Decreased ADL/Functional Activities  2. Increased Pain  3. Decreased [place] with Home Exercise Program INTERVENTIONS PLANNED: (Treatment may consist of any combination of the following)  1. Home Exercise Program (HEP)  2. Manual Therapy including soft tissue and spinal manipulation and mobilization; and dry needling  3. Neuromuscular Re-education/Strengthening  4. Range of Motion (ROM)  5. Therapeutic Activites  6. Therapeutic Exercise/Strengthening  7. Modalities including heat/cold application, electrical stimulation, vasopneumatic compression, ultrasound     GOALS: (Goals have been discussed and agreed upon with patient.)  Short-Term Functional Goals: Time Frame: 6 weeks      1. Pt will demonstrate independence with > or = 2 exercises in HEP.    2. Pt will report no limitations in shoulder with lifting small items at home and return to UE workouts. 3. Pt will report < or = 16 on DASH>   Discharge Goals: Time Frame: 12 weeks  1. Pt will demonstrate independence with > or = 4 exercises in HEP. 2. Pt will repot no limitations with back movements or lifting moderate sized items at home. 3. Pt will report < or = 10 on Oswestry. OUTCOME MEASURE:   Tool Used: Disabilities of the Arm, Shoulder and Hand (DASH) Questionnaire - Quick Version  Score:  Initial: 26/55  Most Recent: X/55 (Date: -- )   Interpretation of Score: The DASH is designed to measure the activities of daily living in person's with upper extremity dysfunction or pain. Each section is scored on a 1-5 scale, 5 representing the greatest disability. The scores of each section are added together for a total score of 55. Tool Used: Modified Oswestry Low Back Pain Questionnaire  Score:  Initial: 20/50  Most Recent: X/50 (Date: -- )   Interpretation of Score: Each section is scored on a 0-5 scale, 5 representing the greatest disability. The scores of each section are added together for a total score of 50. MEDICAL NECESSITY:   · Skilled intervention continues to be required due to decreased function. REASON FOR SERVICES/OTHER COMMENTS:  · Patient continues to require skilled intervention due to decreased function. Total Duration:       Rehabilitation Potential For Stated Goals: Good  Regarding Hina Suazo's therapy, I certify that the treatment plan above will be carried out by a therapist or under their direction. Thank you for this referral,  Prema Rabago, PT, DPT     Referring Physician Signature: Pamela Sanders MD _______________________________ Date _____________     HISTORY:   History of Injury/Illness (Reason for Referral):  Pt reports hx of back pain for 10+ years, off and on, it starts in R low back and spreads.  In November had an episode and got so bad couldn't dress, went away; then in December had an episode that involved sciatica; then in February back spasmed twice and each time had trouble functioning for 1 week after. And last summer lifted a piece of furniture, resulting in anterior shoulder pain and has gotten worse. Last Wednesday pt had a massage that significantly improved pain and function. Wants to get stronger and return to regular work outs. Walks 6 miles each day. Past Medical History/Comorbidities:   Mr. Suresh Narayanan  has a past medical history of Heart abnormality, Hypercholesterolemia, and Neuroendocrine carcinoma of lung ([facility] [address] Mr. Suresh Narayanan  has a past surgical history that includes hx tonsillectomy; hx cataract removal; hx orthopaedic; and hx lobectomy (Left). Social History/Living Environment:     house  Prior Level of Function/Work/Activity:  retired  Dominant Side:         RIGHT   Ambulatory/Rehab Services H2 Model Falls Risk Assessment   Risk Factors:       (1)  Gender [Male] Ability to Rise from Chair:       (0)  Ability to rise in a single movement   Falls Prevention Plan:       No modifications necessary   Total: (5 or greater = High Risk): 0   ©2010 [facility] [place] Patent #[id number]. Federal Law prohibits the replication, distribution or use without written permission from [facility] [place]   Current Medications:       Current Outpatient Medications:     Cialis 5 mg tablet, Take 2 Tablets by mouth daily. , Disp: 90 Tablet, Rfl: 3    dutasteride (AVODART) 0.5 mg capsule, Take 1 Capsule by mouth daily. , Disp: 90 Capsule, Rfl: 3    alfuzosin SR (UROXATRAL) 10 mg SR tablet, Take 1 Tablet by mouth daily. , Disp: 90 Tablet, Rfl: 3    fluticasone propionate (FLONASE ALLERGY RELIEF) 50 mcg/actuation nasal spray, 2 Sprays by Both Nostrils route daily as needed for Rhinitis., Disp: , Rfl:     pseudoephedrine (SUDAFED) 30 mg tablet, Take  by mouth every four (4) hours as needed for Congestion. , Disp: , Rfl:     OTHER, Please provide the patient with a Rib Binder, Disp: 1 Each, Rfl: 0    simvastatin (ZOCOR) 40 mg tablet, Take  by mouth nightly., Disp: , Rfl:     ubidecarenone/vitamin E mixed (COQ10  PO), Take 1 Tab by mouth daily. , Disp: , Rfl:     Omega-3 Fatty Acids 60- mg cpDR, Take 1 Tab by mouth daily. , Disp: , Rfl:     cholecalciferol, vitamin D3, (VITAMIN D3) 2,000 unit tab, Take 1 Tab by mouth daily. , Disp: , Rfl:     aspirin delayed-release 81 mg tablet, Take 81 mg by mouth daily. , Disp: , Rfl:     cpap machine kit, , Disp: , Rfl:     doxepin (SINEQUAN) 10 mg/mL solution, Take 10 mg by mouth nightly as needed. , Disp: , Rfl:    Date Last Reviewed:  3/28/2022     Number of Personal Factors/Comorbidities that affect the Plan of Care: 0: LOW COMPLEXITY   EXAMINATION: from Initial Evaluation unless otherwise noted   Observation:       Standing- increased skin fold R low back compared to L        Gait- no deficits    Strength:  Muscle/Movement Strength at Eval Reassessment Date:    Shoulder flexion R 4/5  L 5/5    Shoulder abduction R 4/5  L 5/5    Shoulder ER R 4/5  L 5/5    Shoulder IR R 4/5  L 5/5         core General reduction in core strength and low back endurance        Range of Motion:   Movement/Joint ROM at eval Reassessment Date:    Cervical flexion wfl    Cervical extension wfl    Cervical rotation R wlf  L 50% no pain    Shoulder elevation wlf B- however pt reported up until massage couldn't lift arm    Shoulder IR behind back L wfl  R to buttock region    Trunk flexion 75% of normal- no pain but apprehensive    Trunk extension 75% of normal no pain    Trunk rotation R 75% of normal no pain  L 70% of normal, no pain                          Palpation:  Increased tone R lumbar paraspinals, R scapulothoracic musculature. Body Structures Involved:  1. Nerves  2. Bones  3. Joints  4. Muscles Body Functions Affected:  1. Sensory/Pain  2. Neuromusculoskeletal  3.  Movement Related Activities and Participation Affected:  1. General Tasks and Demands  2. Mobility  3. Domestic Life  4.  Community, Social and [place]   Number of elements (examined above) that affect the Plan of Care: 1-2: LOW COMPLEXITY   CLINICAL PRESENTATION:   Presentation: Stable and uncomplicated: LOW COMPLEXITY   CLINICAL DECISION MAKING:   Use of outcome tool(s) and clinical judgement create a POC that gives a: Clear prediction of patient's progress: LOW COMPLEXITY       Total Evaluation Duration:       Future Appointments   Date Time Provider Esperanza Shirley   4/4/2022  4:00 PM Abdiel Jasminetos, PT, DPT [facility]   4/6/2022  2:30 PM MarielyeyDaphnieeda Crumb, PT, DPT [facility]   4/11/2022  9:15 AM Lucila Acostas, PT, DPT [facility]   4/13/2022  9:30 AM Abdiel Jasminetos, PT, DPT [facility]   4/18/2022  8:45 AM Xavier Hurter, Merceda Crumb, PT, DPT [facility]   4/20/2022  8:45 AM Xavier Hurter, Merceda Crumb, PT, DPT [facility]   4/25/2022  8:45 AM Xavier Hurter, Merceda Crumb, PT, DPT [facility]   4/27/2022  8:45 AM Abdiel Pintos, PT, DPT [facility]   6/1/2022 50:35 AM SFD CT 64 SLICE UNIT 1 SFDRCT SFD   10/7/2022  8:50 AM Shanice Jo MD [facility] PP PP   10/24/2022  2:30 PM [id number] BLOOD DRAW [id number] PGU   10/31/2022  2:30 PM Adam Alberts MD [id number] PGU   12/1/2022 63:91 AM SFD CT 59 SLICE UNIT 1 SFDRCT SFD       Dejuan Mathis PT, DPT

## 2022-03-28 NOTE — PROGRESS NOTES
Isabella Quinones  : 1945  [place]  Secondary: [address] [facility] at [facility], [address], [place] [address]  [phone]   [phone]      Visit Count:  1    OUTPATIENT PHYSICAL THERAPY: Daily Treatment Note 3/28/2022  ICD-10: Treatment Diagnosis: Low back pain (M54.5), Pain in right shoulder (M25.511)  Precautions/Allergies:   Oxycodone-acetaminophen, Sulfa (sulfonamide antibiotics), Sulfasalazine, and Tyloxapol   TREATMENT PLAN:  Effective Dates: 3/28/2022 TO 2022 (90 days). Frequency/Duration: 2 times a week for 90 Day(s) MEDICAL/REFERRING DIAGNOSIS:  Pain in right shoulder [M25.511]  Low back pain, unspecified [M54.50]   DATE OF ONSET: chronic  REFERRING PHYSICIAN: May Cortez MD MD Orders: evaluate and treat  Return MD Appointment: --       Pre-treatment Subjective Report:  High pain levels until last Wednesday, had a massage and low pain levels since then  Pain: Initial:     low/10 Post Session:   Pain:  low/10  Other:    Medications Last Reviewed:  3/28/2022    Updated Objective Findings:  See evaluation note from today   TREATMENT:   THERAPEUTIC EXERCISE: ( 15 minutes):     Date:  3/28 Date:   Date:     Activity/Exercise Parameters Parameters Parameters         Shoulder ER 2 x 10 otb     Shoulder IR 2 x 10 otb     rows 2 x 10 otb     Low rows 2 x 10 otb     IR stretch X 5           Piriformis stretch 10 sec hold x 3     Child's pose 30 sec     Open book X 5                      MANUAL THERAPY: ( minutes):     Manual Therapy technique and location Date:   Date:   Date:      Parameters Parameters Parameters           MODALITIES ( minutes):    [employer] Portal  Access Code: [email or web address]  URL: [email or web address]  Date: 2022  Prepared by: Edgar Santos    Exercises  Shoulder External Rotation with Resistance - 1 x daily - 7 x weekly - 2 sets - 10 reps  Standing Shoulder Internal Rotation with Anchored Resistance - 1 x daily - 7 x weekly - 2 sets - 10 reps  Shoulder extension with resistance - Neutral - 1 x daily - 7 x weekly - 2 sets - 10 reps  Standing Shoulder Row with Anchored Resistance - 1 x daily - 7 x weekly - 2 sets - 10 reps  Standing Shoulder Internal Rotation Stretch with Towel - 1 x daily - 7 x weekly - 10 reps  Supine Piriformis Stretch with Foot on Ground - 1 x daily - 7 x weekly - 5 reps - 10 second hold  Child's Pose Stretch - 1 x daily - 7 x weekly - 5 reps - 10 second hold  Sidelying Thoracic Rotation with Open Book - 1 x daily - 7 x weekly - 3 sets - 10 reps      Treatment/Session Summary:    · Response to Treatment: Pt demonstrated understanding of exercises, no issues. · Communication/Consultation:  None today  · Equipment provided today:  None today  · Recommendations/Intent for next treatment session: Next visit will focus on shoulder, back stretching, strengthening, pain reduction.   Total Treatment Billable Duration:  20 min eval, 15 min therex       Future Appointments   Date Time Provider Esperanza Watson   4/4/2022  4:00 PM Tobias Russell PT, DPT [facility]   4/6/2022  2:30 PM Rajinder Hardwick PT, DPT [facility]   4/11/2022  9:15 AM Rainey Opitz, PT, DPT [facility]   4/13/2022  9:30 AM Rajinder Adhikari PT, DPT [facility]   4/18/2022  8:45 AM Rajinder Adhikari PT, DPT [facility]   4/20/2022  8:45 AM Rajinder Adhikari PT, DPT [facility]   4/25/2022  8:45 AM Rajinder Adhikari PT, DPT [facility]   4/27/2022  8:45 AM Tobias Russell PT, DPT [facility]   6/1/2022 42:97 AM SFD CT 64 SLICE UNIT 1 SFDRCT SFD   10/7/2022  8:50 AM Khalida Jo MD SSA PP PP   10/24/2022  2:30 PM [id number] BLOOD DRAW [id number] PGU   10/31/2022  2:30 PM Kia Palacio MD [id number] PGU   12/1/2022 38:69 AM SFD CT 59 SLICE UNIT 1 SFDRCT SFD       Johnathon Mark, PT, DPT

## 2022-04-04 ENCOUNTER — HOSPITAL ENCOUNTER (OUTPATIENT)
Dept: PHYSICAL THERAPY | Age: 77
Discharge: HOME | End: 2022-04-04
Payer: MEDICARE

## 2022-04-04 PROCEDURE — 97110 THERAPEUTIC EXERCISES: CPT

## 2022-04-04 PROCEDURE — 97140 MANUAL THERAPY 1/> REGIONS: CPT

## 2022-04-04 NOTE — PROGRESS NOTES
Luis Alberto Ras  : 1945  [place]  Secondary: [address] [facility] at [facility], [address], [place] [address]  [phone]   [phone]      Visit Count:  2    OUTPATIENT PHYSICAL THERAPY: Daily Treatment Note 2022  ICD-10: Treatment Diagnosis: Low back pain (M54.5), Pain in right shoulder (M25.511)  Precautions/Allergies:   Oxycodone-acetaminophen, Sulfa (sulfonamide antibiotics), Sulfasalazine, and Tyloxapol   TREATMENT PLAN:  Effective Dates: 3/28/2022 TO 2022 (90 days).   Frequency/Duration: 2 times a week for 90 Day(s) MEDICAL/REFERRING DIAGNOSIS:  Pain in right shoulder [M25.511]  Low back pain, unspecified [M54.50]   DATE OF ONSET: chronic  REFERRING PHYSICIAN: Marguerite Montejo MD MD Orders: evaluate and treat  Return MD Appointment: --       Pre-treatment Subjective Report:  Shoulder continues to feel weaker but no pain  Pain: Initial:     low/10 Post Session:   Pain:  low/10  Other:    Medications Last Reviewed:  2022    Updated Objective Findings:  None Today   TREATMENT:   THERAPEUTIC EXERCISE: ( 30 minutes):     Date:  3/28 Date:   Date:     Activity/Exercise Parameters Parameters Parameters   e   level 1.5    Shoulder ER 2 x 10 otb 2 x 10 gtb    Shoulder IR 2 x 10 otb 2 x 10 gtb    rows 2 x 10 otb 2 x 10 gtb    Low rows 2 x 10 otb 2 x 10 gtb    touchdown  2 x 10 gtb    IR stretch X 5 X 5    Wall push up  X 10    Doorway pec stretch  15 sec hold x 3    Elbow curl  5# x 20    scaption  2# x 20    abduction  2# x 20                            Piriformis stretch 10 sec hold x 3     Child's pose 30 sec     Open book X 5                      MANUAL THERAPY: (15 minutes):     Manual Therapy technique and location Date:   Date:   Date:      Parameters Parameters Parameters    - R shoulder mobilizations       MODALITIES ( minutes):    [employer] Portal  Access Code: [email or web address]  URL: [email or web address]  Date: 03/28/2022  Prepared by: Smita Abreu    Exercises  Shoulder External Rotation with Resistance - 1 x daily - 7 x weekly - 2 sets - 10 reps  Standing Shoulder Internal Rotation with Anchored Resistance - 1 x daily - 7 x weekly - 2 sets - 10 reps  Shoulder extension with resistance - Neutral - 1 x daily - 7 x weekly - 2 sets - 10 reps  Standing Shoulder Row with Anchored Resistance - 1 x daily - 7 x weekly - 2 sets - 10 reps  Standing Shoulder Internal Rotation Stretch with Towel - 1 x daily - 7 x weekly - 10 reps  Supine Piriformis Stretch with Foot on Ground - 1 x daily - 7 x weekly - 5 reps - 10 second hold  Child's Pose Stretch - 1 x daily - 7 x weekly - 5 reps - 10 second hold  Sidelying Thoracic Rotation with Open Book - 1 x daily - 7 x weekly - 3 sets - 10 reps      Treatment/Session Summary:    · Response to Treatment: Pt tolerated exercises well, no issues. Tight with pec stretch. · Communication/Consultation:  None today  · Equipment provided today:  None today  · Recommendations/Intent for next treatment session: Next visit will focus on shoulder, back stretching, strengthening, pain reduction.   Total Treatment Billable Duration:  30 min therex, 15 min manual therapy  PT Patient Time In/Time Out  Time In: 1615  Time Out: 1700    Future Appointments   Date Time Provider Esperanza Watson   4/6/2022  2:30 PM Elise Serrano PT, DPT [facility]   4/11/2022  9:15 AM Will Melgar PT, DPT [facility]   4/13/2022  9:30 AM Elise Serrano PT, DPT [facility]   4/18/2022  8:45 AM Dalia Andrew PT, DPT [facility]   4/20/2022  8:45 AM Dalia Andrew PT, DPT [facility]   4/25/2022  8:45 AM Dalia Andrew PT, DPT [facility]   4/27/2022  8:45 AM Elise Serrano PT, DPT [facility]   6/1/2022 01:29 AM SFD CT 64 SLICE UNIT 1 SFDRCT SFD   10/7/2022  8:50 AM Ravi Jo MD SSA PP PP 10/24/2022  2:30 PM [id number] BLOOD DRAW [id number] PGU   10/31/2022  2:30 PM Crystal Staples MD [id number] PGU   12/1/2022 77:82 AM SFD CT 59 SLICE UNIT 1 SFDRCT SFD       Callie Calle, PT, DPT yes

## 2022-04-06 ENCOUNTER — HOSPITAL ENCOUNTER (OUTPATIENT)
Dept: PHYSICAL THERAPY | Age: 77
Discharge: HOME | End: 2022-04-06
Payer: MEDICARE

## 2022-04-06 PROCEDURE — 97110 THERAPEUTIC EXERCISES: CPT

## 2022-04-06 PROCEDURE — 97140 MANUAL THERAPY 1/> REGIONS: CPT

## 2022-04-06 NOTE — PROGRESS NOTES
Jared Snyder  : 1945  [place]  Secondary: [address] [facility] M-[facility] at [facility]  [facility] [address], [place]  [phone]   [phone]      Visit Count:  3    OUTPATIENT PHYSICAL THERAPY: Daily Treatment Note 2022  ICD-10: Treatment Diagnosis: Low back pain (M54.5), Pain in right shoulder (M25.511)  Precautions/Allergies:   Oxycodone-acetaminophen, Sulfa (sulfonamide antibiotics), Sulfasalazine, and Tyloxapol   TREATMENT PLAN:  Effective Dates: 3/28/2022 TO 2022 (90 days). Frequency/Duration: 2 times a week for 90 Day(s) MEDICAL/REFERRING DIAGNOSIS:  Pain in right shoulder [M25.511]  Low back pain, unspecified [M54.50]   DATE OF ONSET: chronic  REFERRING PHYSICIAN: Killian Rosario MD MD Orders: evaluate and treat  Return MD Appointment: --       Pre-treatment Subjective Report:  Shoulder was sore after last treatment, no other issues. Concerned about back going out again. Has been feeling sciatica pain on L side recently.    Pain: Initial:     low/10 Post Session:   Pain:  low/10  Other:    Medications Last Reviewed:  2022    Updated Objective Findings:  None Today   TREATMENT:   THERAPEUTIC EXERCISE: ( 30 minutes):     Date:  3/28 Date:   Date:     Activity/Exercise Parameters Parameters Parameters   ube   level 1.5  level 2   Shoulder ER 2 x 10 otb 2 x 10 gtb 2 x 10 btb   Shoulder IR 2 x 10 otb 2 x 10 gtb 2 x 10 btb   rows 2 x 10 otb 2 x 10 gtb Cables 10# x 20   Low rows 2 x 10 otb 2 x 10 gtb Cables 10# x 20   touchdown  2 x 10 gtb    IR stretch X 5 X 5    Wall push up  X 10    Doorway pec stretch  15 sec hold x 3 15 sec hold x 3   Elbow curl  5# x 20    scaption  2# x 20    abduction  2# x 20                            Piriformis stretch 10 sec hold x 3  10 sec hold x 3   Child's pose 30 sec     Open book X 5      Row dog   X 10   Dead lift to knee height   10# x 10   Band multifidus gtb  - with 10 sec holds x 5  - with reach outs x 10  - with small trunk rotations x 10                               MANUAL THERAPY: (25 minutes):     Manual Therapy technique and location Date:  4/4 Date:  4/6 Date:      Parameters Parameters Parameters    - R shoulder mobilizations - mfr R pec major insertion  - pec stretch  - GH mobilizations  - scapular mobilizations  - L piriformis release      MODALITIES ( minutes):    [employer]  Access Code: [email or web address]  URL: [email or web address]  Date: 03/28/2022  Prepared by: Maverick Morin    Exercises  Shoulder External Rotation with Resistance - 1 x daily - 7 x weekly - 2 sets - 10 reps  Standing Shoulder Internal Rotation with Anchored Resistance - 1 x daily - 7 x weekly - 2 sets - 10 reps  Shoulder extension with resistance - Neutral - 1 x daily - 7 x weekly - 2 sets - 10 reps  Standing Shoulder Row with Anchored Resistance - 1 x daily - 7 x weekly - 2 sets - 10 reps  Standing Shoulder Internal Rotation Stretch with Towel - 1 x daily - 7 x weekly - 10 reps  Supine Piriformis Stretch with Foot on Ground - 1 x daily - 7 x weekly - 5 reps - 10 second hold  Child's Pose Stretch - 1 x daily - 7 x weekly - 5 reps - 10 second hold  Sidelying Thoracic Rotation with Open Book - 1 x daily - 7 x weekly - 3 sets - 10 reps      Treatment/Session Summary:    · Response to Treatment: Pt apprehensive and nervous to do back flexion activities. Worked on trunk control. Next tx work on core progression. · Communication/Consultation:  None today  · Equipment provided today:  None today  · Recommendations/Intent for next treatment session: Next visit will focus on shoulder, back stretching, strengthening, pain reduction.   Total Treatment Billable Duration:  30 min therex, 25 min manual therapy  PT Patient Time In/Time Out  Time In: 1430  Time Out: 1530    Future Appointments   Date Time Provider Esperanza Shirley   4/11/2022  9:15 AM Lucila Clark, PT, DPT [facility]   4/13/2022  9:30 AM Willow Hardwicko, PT, DPT [facility]   4/18/2022  8:45 AM Willow Ponce, PT, DPT [facility]   4/20/2022  8:45 AM Francisca Lentz, PT, DPT [facility]   4/25/2022  8:45 AM Willow Ponce, PT, DPT [facility]   4/27/2022  8:45 AM Francisca Lentz, PT, DPT [facility]   6/1/2022 10:63 AM SFD CT 64 SLICE UNIT 1 SFDRCT SFD   10/7/2022  8:50 AM Brayan Jo MD SSA PP PP   10/24/2022  2:30 PM [id number] BLOOD DRAW [id number] PGU   10/31/2022  2:30 PM Hermila Pike MD [id number] PGU   12/1/2022 25:11 AM SFD CT 59 SLICE UNIT 1 SFDRCT SFD       Hanane Mcduffie PT, DPT

## 2022-04-11 ENCOUNTER — APPOINTMENT (OUTPATIENT)
Dept: PHYSICAL THERAPY | Age: 77
End: 2022-04-11
Payer: MEDICARE

## 2022-04-13 ENCOUNTER — HOSPITAL ENCOUNTER (OUTPATIENT)
Dept: PHYSICAL THERAPY | Age: 77
Discharge: HOME | End: 2022-04-13
Payer: MEDICARE

## 2022-04-13 PROCEDURE — 97140 MANUAL THERAPY 1/> REGIONS: CPT

## 2022-04-13 PROCEDURE — 97110 THERAPEUTIC EXERCISES: CPT

## 2022-04-13 NOTE — PROGRESS NOTES
Evelia Michaud  : 1945  [place]  Secondary: [address] [facility] at [facility]  [address] , [address], [place] [address]  [phone]   [phone]      Visit Count:  4    OUTPATIENT PHYSICAL THERAPY: Daily Treatment Note 2022  ICD-10: Treatment Diagnosis: Low back pain (M54.5), Pain in right shoulder (M25.511)  Precautions/Allergies:   Oxycodone-acetaminophen, Sulfa (sulfonamide antibiotics), Sulfasalazine, and Tyloxapol   TREATMENT PLAN:  Effective Dates: 3/28/2022 TO 2022 (90 days). Frequency/Duration: 2 times a week for 90 Day(s) MEDICAL/REFERRING DIAGNOSIS:  Pain in right shoulder [M25.511]  Low back pain, unspecified [M54.50]   DATE OF ONSET: chronic  REFERRING PHYSICIAN: Dee Witt MD MD Orders: evaluate and treat  Return MD Appointment: --       Pre-treatment Subjective Report:  Some anterior shoulder pain just in the past day with the exercises. Some back soreness, but otherwise doing fine.    Pain: Initial:     low/10 Post Session:   Pain:  low/10  Other:    Medications Last Reviewed:  2022    Updated Objective Findings:  None Today   TREATMENT:   THERAPEUTIC EXERCISE: ( 30 minutes):     Date:  3/28 Date:   Date:     Activity/Exercise Parameters Parameters Parameters    ube   level 1.5  level 2    Recumbent stepper    10 min level 3   Shoulder ER 2 x 10 otb 2 x 10 gtb 2 x 10 btb X 20, gtb   Shoulder IR 2 x 10 otb 2 x 10 gtb 2 x 10 btb X 20, gtb   rows 2 x 10 otb 2 x 10 gtb Cables 10# x 20 Cables 7# x 20   Low rows 2 x 10 otb 2 x 10 gtb Cables 10# x 20 Cables 7# x 20   touchdown  2 x 10 gtb     IR stretch X 5 X 5     Wall push up  X 10     Doorway pec stretch  15 sec hold x 3 15 sec hold x 3 30 sec hold   Elbow curl  5# x 20     scaption  2# x 20     abduction  2# x 20     Cable down and across    7# x 10                        Piriformis stretch 10 sec hold x 3  10 sec hold x 3 10 sec hold x 3   Child's pose 30 sec   30 sec x 2   Open book X 5       Row dog   X 10 X 10   Dead lift to knee height   10# x 10 10# x 10   Band multifidus   gtb  - with 10 sec holds x 5  - with reach outs x 10  - with small trunk rotations x 10 gtb  - with reach outs x 10  - with small trunk rotations x 10   Bent over rows    10# x 10   Tall kneeling trunk flexion    X 10                     MANUAL THERAPY: (25 minutes):     Manual Therapy technique and location Date:  4/4 Date:  4/6 Date:  4/13    Parameters Parameters Parameters    - R shoulder mobilizations - mfr R pec major insertion  - pec stretch  - GH mobilizations  - scapular mobilizations  - L piriformis release - mfr R pec major insertion  - pec stretch   - mfr B lumbar paraspinals     Dry Needling  - trigger point needling to R pec major, verbal consent given 4/13/22    MODALITIES ( minutes):    Connect  Access Code: [email or web address]  URL: [email or web address]  Date: 03/28/2022  Prepared by: Loretta Mosley    Exercises  Shoulder External Rotation with Resistance - 1 x daily - 7 x weekly - 2 sets - 10 reps  Standing Shoulder Internal Rotation with Anchored Resistance - 1 x daily - 7 x weekly - 2 sets - 10 reps  Shoulder extension with resistance - Neutral - 1 x daily - 7 x weekly - 2 sets - 10 reps  Standing Shoulder Row with Anchored Resistance - 1 x daily - 7 x weekly - 2 sets - 10 reps  Standing Shoulder Internal Rotation Stretch with Towel - 1 x daily - 7 x weekly - 10 reps  Supine Piriformis Stretch with Foot on Ground - 1 x daily - 7 x weekly - 5 reps - 10 second hold  Child's Pose Stretch - 1 x daily - 7 x weekly - 5 reps - 10 second hold  Sidelying Thoracic Rotation with Open Book - 1 x daily - 7 x weekly - 3 sets - 10 reps      Treatment/Session Summary:    · Response to Treatment: Pt agreeable to dry needling, no complications reported. Soreness reported front of R shoulder with activities, little to no pain in back or shoulder. · Communication/Consultation:  None today  · Equipment provided today:  None today  · Recommendations/Intent for next treatment session: Next visit will focus on shoulder, back stretching, strengthening, pain reduction.   Total Treatment Billable Duration:  30 min therex, 25 min manual therapy  PT Patient Time In/Time Out  Time In: 0930  Time Out: 1030    Future Appointments   Date Time Provider Esperanza Watson   4/18/2022  8:45 AM Elfego Hardwick, [facility]   4/20/2022  8:45 AM Elfego Hardwick PT, DPT [facility]   4/25/2022  8:45 AM Elfego Nicholas, PT, DPT [facility]   4/27/2022  8:45 AM Areli Barnett PT, DPT [facility]   6/1/2022 63:30 AM SFD CT 64 SLICE UNIT 1 SFDRCT SFD   10/7/2022  8:50 AM Mamie Jo MD SSA PP PP   10/24/2022  2:30 PM [id number] BLOOD DRAW [id number] PGU   10/31/2022  2:30 PM Hung Ospina MD [id number] PGU   12/1/2022 27:11 AM SFD CT 59 SLICE UNIT 1 SFDRCT SFD       Sherryle Lea, PT, DPT

## 2022-04-18 ENCOUNTER — HOSPITAL ENCOUNTER (OUTPATIENT)
Dept: PHYSICAL THERAPY | Age: 77
Discharge: HOME | End: 2022-04-18
Payer: MEDICARE

## 2022-04-18 PROCEDURE — 97110 THERAPEUTIC EXERCISES: CPT

## 2022-04-18 PROCEDURE — 97140 MANUAL THERAPY 1/> REGIONS: CPT

## 2022-04-18 NOTE — PROGRESS NOTES
Dann Alfonso  : 1945  [place]  Secondary: [address] [facility] at [facility]  [facility] [address], [place] [address]  [phone]   [phone]      Visit Count:  5    OUTPATIENT PHYSICAL THERAPY: Daily Treatment Note 2022  ICD-10: Treatment Diagnosis: Low back pain (M54.5), Pain in right shoulder (M25.511)  Precautions/Allergies:   Oxycodone-acetaminophen, Sulfa (sulfonamide antibiotics), Sulfasalazine, and Tyloxapol   TREATMENT PLAN:  Effective Dates: 3/28/2022 TO 2022 (90 days). Frequency/Duration: 2 times a week for 90 Day(s) MEDICAL/REFERRING DIAGNOSIS:  Pain in right shoulder [M25.511]  Low back pain, unspecified [M54.50]   DATE OF ONSET: chronic  REFERRING PHYSICIAN: Joaquin Pardo MD MD Orders: evaluate and treat  Return MD Appointment: --       Pre-treatment Subjective Report:  Reports soreness for 2 days after last treatment, today feeling ok.    Pain: Initial:     low/10 Post Session:   Pain:  low/10  Other:    Medications Last Reviewed:  2022    Updated Objective Findings:  None Today   TREATMENT:   THERAPEUTIC EXERCISE: ( 30 minutes):     Date:  3/28 Date:   Date:     Activity/Exercise Parameters Parameters Parameters     ube   level 1.5  level 2     Recumbent stepper    10 min level 3 10 min level 2   Shoulder ER 2 x 10 otb 2 x 10 gtb 2 x 10 btb X 20, gtb 2 x 10, gtb   Shoulder IR 2 x 10 otb 2 x 10 gtb 2 x 10 btb X 20, gtb 2 x 10, gtb   rows 2 x 10 otb 2 x 10 gtb Cables 10# x 20 Cables 7# x 20    Low rows 2 x 10 otb 2 x 10 gtb Cables 10# x 20 Cables 7# x 20    touchdown  2 x 10 gtb      IR stretch X 5 X 5      Wall push up  X 10      Doorway pec stretch  15 sec hold x 3 15 sec hold x 3 30 sec hold    Elbow curl  5# x 20      scaption  2# x 20      abduction  2# x 20      Cable down and across    7# x 10 7# x 15, each way   Cable punch forwards     7# x 15   Supine chest press 5# 2 x 10           Piriformis stretch 10 sec hold x 3  10 sec hold x 3 10 sec hold x 3    Child's pose 30 sec   30 sec x 2 30 sec x 2   Open book X 5     X 10   Row dog   X 10 X 10 X 10, 2#   Dead lift to knee height   10# x 10 10# x 10 15# x 10   Band multifidus   gtb  - with 10 sec holds x 5  - with reach outs x 10  - with small trunk rotations x 10 gtb  - with reach outs x 10  - with small trunk rotations x 10    Bent over rows    10# x 10    Tall kneeling trunk flexion    X 10                        MANUAL THERAPY: (15 minutes):     Manual Therapy technique and location Date:  4/4 Date:  4/6 Date:  4/13 4/18    Parameters Parameters Parameters     - R shoulder mobilizations - mfr R pec major insertion  - pec stretch  - GH mobilizations  - scapular mobilizations  - L piriformis release - mfr R pec major insertion  - pec stretch   - mfr B lumbar paraspinals - mobilizations R GH joint  - gentle stretch R pec major     Dry Needling  - trigger point needling to R pec major, verbal consent given 4/13/22    MODALITIES ( minutes):    [employer]EL  Access Code: [email or web address]  URL: [email or web address]  Date: 03/28/2022  Prepared by: Evelia Restrepo    Exercises  Shoulder External Rotation with Resistance - 1 x daily - 7 x weekly - 2 sets - 10 reps  Standing Shoulder Internal Rotation with Anchored Resistance - 1 x daily - 7 x weekly - 2 sets - 10 reps  Shoulder extension with resistance - Neutral - 1 x daily - 7 x weekly - 2 sets - 10 reps  Standing Shoulder Row with Anchored Resistance - 1 x daily - 7 x weekly - 2 sets - 10 reps  Standing Shoulder Internal Rotation Stretch with Towel - 1 x daily - 7 x weekly - 10 reps  Supine Piriformis Stretch with Foot on Ground - 1 x daily - 7 x weekly - 5 reps - 10 second hold  Child's Pose Stretch - 1 x daily - 7 x weekly - 5 reps - 10 second hold  Sidelying Thoracic Rotation with Open Book - 1 x daily - 7 x weekly - 3 sets - 10 reps      Treatment/Session Summary:    · Response to Treatment: Pt reported some soreness with exercises, no other issues. · Communication/Consultation:  None today  · Equipment provided today:  None today  · Recommendations/Intent for next treatment session: Next visit will focus on shoulder, back stretching, strengthening, pain reduction.   Total Treatment Billable Duration:  30 min therex, 15 min manual therapy  PT Patient Time In/Time Out  Time In: 0845  Time Out: 0930    Future Appointments   Date Time Provider Esperanza Shirley   4/20/2022  8:45 AM [facility]   4/25/2022  8:45 AM Dal[facility] Hardwick PT[facility]   4/27/2022  8:45 AM [facility]   6/1/2022 78:90 AM SFD CT 64 SLICE UNIT 1 SFDRCT SFD   10/7/2022  8:50 AM Ravi Jo MD SSA PP PP   10/24/2022  2:30 PM [id number] BLOOD DRAW [id number] PGU   10/31/2022  2:30 PM Mariangel Paul MD [id number] PGU   12/1/2022 13:23 AM SFD CT 59 SLICE UNIT 1 SFDRCT D       Laura Hyman PT, DPT

## 2022-04-20 ENCOUNTER — HOSPITAL ENCOUNTER (OUTPATIENT)
Dept: PHYSICAL THERAPY | Age: 77
Discharge: HOME | End: 2022-04-20
Payer: MEDICARE

## 2022-04-20 PROCEDURE — 97110 THERAPEUTIC EXERCISES: CPT

## 2022-04-20 PROCEDURE — 97140 MANUAL THERAPY 1/> REGIONS: CPT

## 2022-04-20 NOTE — PROGRESS NOTES
Yarely Trujillo  : 1945  [place]  Secondary: [facility]  [address], [address], [place] [address]  [phone]   [phone]      Visit Count:  6    OUTPATIENT PHYSICAL THERAPY: Daily Treatment Note 2022  ICD-10: Treatment Diagnosis: Low back pain (M54.5), Pain in right shoulder (M25.511)  Precautions/Allergies:   Oxycodone-acetaminophen, Sulfa (sulfonamide antibiotics), Sulfasalazine, and Tyloxapol   TREATMENT PLAN:  Effective Dates: 3/28/2022 TO 2022 (90 days). Frequency/Duration: 2 times a week for 90 Day(s) MEDICAL/REFERRING DIAGNOSIS:  Pain in right shoulder [M25.511]  Low back pain, unspecified [M54.50]   DATE OF ONSET: chronic  REFERRING PHYSICIAN: Jian Yung MD MD Orders: evaluate and treat  Return MD Appointment: --       Pre-treatment Subjective Report:  Reports continued soreness anterior R shoulder, otherwise everything else feeling ok.     Pain: Initial:     low/10 Post Session:   Pain:  low/10  Other:    Medications Last Reviewed:  2022    Updated Objective Findings:  None Today   TREATMENT:   THERAPEUTIC EXERCISE: ( 40 minutes):     Date:  3/28 Date:   Date:     Activity/Exercise Parameters Parameters Parameters      ube   level 1.5  level 2      Recumbent stepper    10 min level 3 10 min level 2 10 min level 2   Shoulder ER 2 x 10 otb 2 x 10 gtb 2 x 10 btb X 20, gtb 2 x 10, gtb 2 x 10, gtb   Shoulder IR 2 x 10 otb 2 x 10 gtb 2 x 10 btb X 20, gtb 2 x 10, gtb 2 x 10, gtb   rows 2 x 10 otb 2 x 10 gtb Cables 10# x 20 Cables 7# x 20  Cables 10#   2 x 10   Low rows 2 x 10 otb 2 x 10 gtb Cables 10# x 20 Cables 7# x 20  Cables 10#   2 x 10   touchdown  2 x 10 gtb       IR stretch X 5 X 5       Wall push up  X 10       Doorway pec stretch  15 sec hold x 3 15 sec hold x 3 30 sec hold     Elbow curl  5# x 20       scaption  2# x 20       abduction  2# x 20 Cable down and across    7# x 10 7# x 15, each way 7# 2 x 10, each way   Cable punch forwards     7# x 15 7# 2 x 10   Supine chest press     5# 2 x 10 5# 2 x 15   Cable up and across      3# x 5   Piriformis stretch 10 sec hold x 3  10 sec hold x 3 10 sec hold x 3     Child's pose 30 sec   30 sec x 2 30 sec x 2    Open book X 5     X 10    Row dog   X 10 X 10 X 10, 2#    Dead lift to knee height   10# x 10 10# x 10 15# x 10 10# 2 x 10   Band multifidus   gtb  - with 10 sec holds x 5  - with reach outs x 10  - with small trunk rotations x 10 gtb  - with reach outs x 10  - with small trunk rotations x 10     Bent over rows    10# x 10     Tall kneeling trunk flexion    X 10                           MANUAL THERAPY: (15 minutes):     Manual Therapy technique and location Date:  4/4 Date:  4/6 Date:  4/13 4/18 4/20    Parameters Parameters Parameters      - R shoulder mobilizations - mfr R pec major insertion  - pec stretch  - GH mobilizations  - scapular mobilizations  - L piriformis release - mfr R pec major insertion  - pec stretch   - mfr B lumbar paraspinals - mobilizations R GH joint  - gentle stretch R pec major - mobilizations R GH joint  - gentle stretch and mfr R pec major     Dry Needling  - trigger point needling to R pec major, verbal consent given 4/13/22    MODALITIES ( minutes):    [employer]  Access Code: [email or web address]  URL: [email or web address]  Date: 03/28/2022  Prepared by: Michelle Farrell    Exercises  Shoulder External Rotation with Resistance - 1 x daily - 7 x weekly - 2 sets - 10 reps  Standing Shoulder Internal Rotation with Anchored Resistance - 1 x daily - 7 x weekly - 2 sets - 10 reps  Shoulder extension with resistance - Neutral - 1 x daily - 7 x weekly - 2 sets - 10 reps  Standing Shoulder Row with Anchored Resistance - 1 x daily - 7 x weekly - 2 sets - 10 reps  Standing Shoulder Internal Rotation Stretch with Towel - 1 x daily - 7 x weekly - 10 reps  Supine Piriformis Stretch with Foot on Ground - 1 x daily - 7 x weekly - 5 reps - 10 second hold  Child's Pose Stretch - 1 x daily - 7 x weekly - 5 reps - 10 second hold  Sidelying Thoracic Rotation with Open Book - 1 x daily - 7 x weekly - 3 sets - 10 reps      Treatment/Session Summary:    · Response to Treatment: Pt issues with pulling up and across, otherwise no issues. Gave band to work on this movement. · Communication/Consultation:  None today  · Equipment provided today:  None today  · Recommendations/Intent for next treatment session: Next visit will focus on shoulder, back stretching, strengthening, pain reduction.   Total Treatment Billable Duration:  40 min therex, 15 min manual therapy  PT Patient Time In/Time Out  Time In: 3458  Time Out: 2356    Future Appointments   Date Time Provider Esperanza Donatoi   4/25/2022  8:45 AM Karlynn Hammans, PT, DPT [facility]   4/27/2022  8:45 AM Tiny Hernandez, PT, DPT [facility]   6/1/2022 70:81 AM SFD CT 64 SLICE UNIT 1 SFDRCT SFD   10/7/2022  8:50 AM Markie Jo MD SSA PP PP   10/24/2022  2:30 PM [id number] BLOOD DRAW [id number] PGU   10/31/2022  2:30 PM Melissa Crawford MD [id number] PGU   12/1/2022 91:96 AM SFD CT 59 SLICE UNIT 1 SFDRCT SFD       Ryan Menendez, PT, DPT

## 2022-04-25 ENCOUNTER — HOSPITAL ENCOUNTER (OUTPATIENT)
Dept: PHYSICAL THERAPY | Age: 77
Discharge: HOME | End: 2022-04-25
Payer: MEDICARE

## 2022-04-25 PROCEDURE — 97140 MANUAL THERAPY 1/> REGIONS: CPT

## 2022-04-25 PROCEDURE — 97110 THERAPEUTIC EXERCISES: CPT

## 2022-04-25 NOTE — PROGRESS NOTES
Autumn Ibrahim  : 1945  [place]  Secondary: [address] at [facility], [facility]  [address] , [address], [place] [address]  [phone]   [phone]      Visit Count:  7    OUTPATIENT PHYSICAL THERAPY: Daily Treatment Note 2022  ICD-10: Treatment Diagnosis: Low back pain (M54.5), Pain in right shoulder (M25.511)  Precautions/Allergies:   Oxycodone-acetaminophen, Sulfa (sulfonamide antibiotics), Sulfasalazine, and Tyloxapol   TREATMENT PLAN:  Effective Dates: 3/28/2022 TO 2022 (90 days). Frequency/Duration: 2 times a week for 90 Day(s) MEDICAL/REFERRING DIAGNOSIS:  Pain in right shoulder [M25.511]  Low back pain, unspecified [M54.50]   DATE OF ONSET: chronic  REFERRING PHYSICIAN: Mona Laguna MD MD Orders: evaluate and treat  Return MD Appointment: --       Pre-treatment Subjective Report:  Reports continued soreness anterior R shoulder, had a massage last week but didn't have the significant improvements that experienced after the first massage.      Pain: Initial:     low/10 Post Session:   Pain:  low/10  Other:    Medications Last Reviewed:  2022    Updated Objective Findings:  None Today   TREATMENT:   THERAPEUTIC EXERCISE: ( 30 minutes):     Date:   Date:     Activity/Exercise Parameters Parameters       ube  level 1.5  level 2     level 2   Recumbent stepper   10 min level 3 10 min level 2 10 min level 2    Shoulder ER 2 x 10 gtb 2 x 10 btb X 20, gtb 2 x 10, gtb 2 x 10, gtb 2 x 10, gtb   Shoulder IR 2 x 10 gtb 2 x 10 btb X 20, gtb 2 x 10, gtb 2 x 10, gtb 2 x 10, gtb   rows 2 x 10 gtb Cables 10# x 20 Cables 7# x 20  Cables 10#   2 x 10 Cables 10# 2 x 10   Low rows 2 x 10 gtb Cables 10# x 20 Cables 7# x 20  Cables 10#   2 x 10 Cables 10# 2 x 10   touchdown 2 x 10 gtb        IR stretch X 5        Wall push up X 10        Doorway pec stretch 15 sec hold x 3 15 sec hold x 3 30 sec hold      Elbow curl 5# x 20        scaption 2# x 20        abduction 2# x 20        Cable down and across   7# x 10 7# x 15, each way 7# 2 x 10, each way 7# 2 x 10, each way   Cable punch forwards    7# x 15 7# 2 x 10 7# 2 x 10   Supine chest press    5# 2 x 10 5# 2 x 15 3# 2 x 15   Cable up and across     3# x 5 3# 2 x 10   Piriformis stretch  10 sec hold x 3 10 sec hold x 3      Child's pose   30 sec x 2 30 sec x 2     Open book    X 10     Row dog  X 10 X 10 X 10, 2#     Dead lift to knee height  10# x 10 10# x 10 15# x 10 10# 2 x 10    Band multifidus  gtb  - with 10 sec holds x 5  - with reach outs x 10  - with small trunk rotations x 10 gtb  - with reach outs x 10  - with small trunk rotations x 10      Bent over rows   10# x 10      Tall kneeling trunk flexion   X 10      Wall elbow touches with scap retract      X 10   y's       gtb 2 x 10       MANUAL THERAPY: (15 minutes):     Manual Therapy technique and location Date:  4/6 Date:  4/13 4/18 4/20 4/25    Parameters Parameters       - mfr R pec major insertion  - pec stretch  - GH mobilizations  - scapular mobilizations  - L piriformis release - mfr R pec major insertion  - pec stretch   - mfr B lumbar paraspinals - mobilizations R GH joint  - gentle stretch R pec major - mobilizations R GH joint  - gentle stretch and mfr R pec major - mobilizations R GH joint  - gentle stretch and mfr R pec major     Dry Needling  - trigger point needling to R pec major, verbal consent given 4/13/22    MODALITIES ( minutes):    [employer] Portal  Access Code: [email or web address]  URL: [email or web address]  Date: 03/28/2022  Prepared by: Aashish Hutchinson    Exercises  Shoulder External Rotation with Resistance - 1 x daily - 7 x weekly - 2 sets - 10 reps  Standing Shoulder Internal Rotation with Anchored Resistance - 1 x daily - 7 x weekly - 2 sets - 10 reps  Shoulder extension with resistance - Neutral - 1 x daily - 7 x weekly - 2 sets - 10 reps  Standing Shoulder Row with Anchored Resistance - 1 x daily - 7 x weekly - 2 sets - 10 reps  Standing Shoulder Internal Rotation Stretch with Towel - 1 x daily - 7 x weekly - 10 reps  Supine Piriformis Stretch with Foot on Ground - 1 x daily - 7 x weekly - 5 reps - 10 second hold  Child's Pose Stretch - 1 x daily - 7 x weekly - 5 reps - 10 second hold  Sidelying Thoracic Rotation with Open Book - 1 x daily - 7 x weekly - 3 sets - 10 reps      Treatment/Session Summary:    · Response to Treatment: Pt reported increased soreness in anterior R shoulder with exercises. · Communication/Consultation:  None today  · Equipment provided today:  None today  · Recommendations/Intent for next treatment session: Next visit will focus on shoulder, back stretching, strengthening, pain reduction.   Total Treatment Billable Duration:  30 min therex, 15 min manual therapy  PT Patient Time In/Time Out  Time In: 0845  Time Out: 0930    Future Appointments   Date Time Provider Esperanza Donatoi   4/27/2022  8:45 AM Tobias Russell PT, DPT [facility]   6/1/2022 04:82 AM SFD CT 64 SLICE UNIT 1 SFDRCT [facility]   10/7/2022  8:50 AM Khalida Jo MD SSA PP PP   10/24/2022  2:30 PM [id number] BLOOD DRAW [id number] PGU   10/31/2022  2:30 PM Kia Palacio MD [id number] PGU   12/1/2022 20:70 AM SFD CT 59 SLICE UNIT 1 SFDRCT D       Johnathon Mark PT, DPT

## 2022-04-27 ENCOUNTER — HOSPITAL ENCOUNTER (OUTPATIENT)
Dept: PHYSICAL THERAPY | Age: 77
Discharge: HOME | End: 2022-04-27
Payer: MEDICARE

## 2022-04-27 PROCEDURE — 97110 THERAPEUTIC EXERCISES: CPT

## 2022-04-27 PROCEDURE — 97140 MANUAL THERAPY 1/> REGIONS: CPT

## 2022-04-27 NOTE — PROGRESS NOTES
Paul Leigh  : 1945  [place]  Secondary: [address] [facility] at [facility]  [facility] [address], [place] [address]  [phone]   [phone]      Visit Count:  8    OUTPATIENT PHYSICAL THERAPY: Daily Treatment Note 2022  ICD-10: Treatment Diagnosis: Low back pain (M54.5), Pain in right shoulder (M25.511)  Precautions/Allergies:   Oxycodone-acetaminophen, Sulfa (sulfonamide antibiotics), Sulfasalazine, and Tyloxapol   TREATMENT PLAN:  Effective Dates: 3/28/2022 TO 2022 (90 days). Frequency/Duration: 2 times a week for 90 Day(s) MEDICAL/REFERRING DIAGNOSIS:  Pain in right shoulder [M25.511]  Low back pain, unspecified [M54.50]   DATE OF ONSET: chronic  REFERRING PHYSICIAN: Gwinda Najjar, MD MD Orders: evaluate and treat  Return MD Appointment: --       Pre-treatment Subjective Report:  Reports continued soreness anterior R shoulder, and had a back spasm after sitting for a while and turning to the left.    Pain: Initial:     sore/10 Post Session:   Pain:  sore/10  Other:    Medications Last Reviewed:  2022    Updated Objective Findings:  None Today   TREATMENT:   THERAPEUTIC EXERCISE: ( 30 minutes):        Activity/Exercise        ube     level 2  level 2   Recumbent stepper 10 min level 3 10 min level 2 10 min level 2     Shoulder ER X 20, gtb 2 x 10, gtb 2 x 10, gtb 2 x 10, gtb 2 x 10 gtb   Shoulder IR X 20, gtb 2 x 10, gtb 2 x 10, gtb 2 x 10, gtb 2 x 10 gtb   rows Cables 7# x 20  Cables 10#   2 x 10 Cables 10# 2 x 10 Cables 3# x 20   Low rows Cables 7# x 20  Cables 10#   2 x 10 Cables 10# 2 x 10 Cables 3# x 20   touchdown        IR stretch        Wall push up        Doorway pec stretch 30 sec hold       Elbow curl        scaption        abduction        Cable down and across 7# x 10 7# x 15, each way 7# 2 x 10, each way 7# 2 x 10, each way 3# x 20   Cable punch forwards  7# x 15 7# 2 x 10 7# 2 x 10 3# x 20   Supine chest press  5# 2 x 10 5# 2 x 15 3# 2 x 15    Cable up and across   3# x 5 3# 2 x 10 3# x 20   Piriformis stretch 10 sec hold x 3       Child's pose 30 sec x 2 30 sec x 2      Open book  X 10      Row dog X 10 X 10, 2#      Dead lift to knee height 10# x 10 15# x 10 10# 2 x 10     Band multifidus gtb  - with reach outs x 10  - with small trunk rotations x 10       Bent over rows 10# x 10       Tall kneeling trunk flexion X 10       Wall elbow touches with scap retract    X 10    y's     gtb 2 x 10    Seated trunk flexion     X 10   Seated trunk rotation with reach     X 10   Seated trunk sidebending stretch     X 10                       MANUAL THERAPY: (15 minutes):     Manual Therapy technique and location 4/18 4/20 4/25 4/27           - mobilizations R GH joint  - gentle stretch R pec major - mobilizations R GH joint  - gentle stretch and mfr R pec major - mobilizations R GH joint  - gentle stretch and mfr R pec major - mobilizations R GH joint  - gentle stretch and mfr R pec major     Dry Needling  - trigger point needling to R pec major, verbal consent given 4/13/22    MODALITIES ( minutes):    [employer] Portal  Access Code: [email or web address]  URL: [email or web address]  Date: 03/28/2022  Prepared by: Smita Abreu    Exercises  Shoulder External Rotation with Resistance - 1 x daily - 7 x weekly - 2 sets - 10 reps  Standing Shoulder Internal Rotation with Anchored Resistance - 1 x daily - 7 x weekly - 2 sets - 10 reps  Shoulder extension with resistance - Neutral - 1 x daily - 7 x weekly - 2 sets - 10 reps  Standing Shoulder Row with Anchored Resistance - 1 x daily - 7 x weekly - 2 sets - 10 reps  Standing Shoulder Internal Rotation Stretch with Towel - 1 x daily - 7 x weekly - 10 reps  Supine Piriformis Stretch with Foot on Ground - 1 x daily - 7 x weekly - 5 reps - 10 second hold  Child's Pose Stretch - 1 x daily - 7 x weekly - 5 reps - 10 second hold  Sidelying Thoracic Rotation with Open Book - 1 x daily - 7 x weekly - 3 sets - 10 reps      Access Code: [address] [facility]  URL: [email or web address]  Date: 04/27/2022  Prepared by: Xavier Downey    Exercises  Seated flexion with side bias - 2 x daily - 7 x weekly - 10 reps  Seated Punches with Trunk Rotation - 2 x daily - 7 x weekly - 10 reps  Seated Sidebending - 2 x daily - 7 x weekly - 10 reps        Treatment/Session Summary:    · Response to Treatment: Discussed decreasing prolong sitting time. Traveling to [facility] for cancer treatments in two weeks, so pausing on physical therapy for the immediate future. · Communication/Consultation:  None today  · Equipment provided today:  None today  · Recommendations/Intent for next treatment session: Next visit will focus on shoulder, back stretching, strengthening, pain reduction.   Total Treatment Billable Duration:  30 min therex, 15 min manual therapy  PT Patient Time In/Time Out  Time In: 0845  Time Out: 0930    Future Appointments   Date Time Provider Esperanza Watson   6/1/2022 44:28 AM SFD CT 64 SLICE UNIT 1 SFDRCT [facility]   10/7/2022  8:50 AM Sujit Jo MD SSA PP PP   10/24/2022  2:30 PM [id number] BLOOD DRAW [id number] PGU   10/31/2022  2:30 PM Deep Cassidy MD [id number] PGU   12/1/2022 45:95 AM SFD CT 59 SLICE UNIT 1 SFDRCT SFD       Marlo Sheikh PT, DPT

## 2022-10-07 ENCOUNTER — OFFICE VISIT (OUTPATIENT)
Dept: PULMONOLOGY | Age: 77
End: 2022-10-07
Payer: MEDICARE

## 2022-10-07 VITALS
SYSTOLIC BLOOD PRESSURE: 138 MMHG | DIASTOLIC BLOOD PRESSURE: 84 MMHG | WEIGHT: 139 LBS | HEART RATE: 93 BPM | OXYGEN SATURATION: 98 % | HEIGHT: 66 IN | TEMPERATURE: 97.2 F | BODY MASS INDEX: 22.34 KG/M2

## 2022-10-07 DIAGNOSIS — C82.93 FOLLICULAR LYMPHOMA OF INTRA-ABDOMINAL LYMPH NODES, UNSPECIFIED GRADE (HCC): Primary | ICD-10-CM

## 2022-10-07 DIAGNOSIS — D3A.8 OTHER BENIGN NEUROENDOCRINE TUMORS: ICD-10-CM

## 2022-10-07 DIAGNOSIS — C7A.090 MALIGNANT CARCINOID TUMOR OF THE BRONCHUS AND LUNG (HCC): ICD-10-CM

## 2022-10-07 PROCEDURE — 1123F ACP DISCUSS/DSCN MKR DOCD: CPT | Performed by: INTERNAL MEDICINE

## 2022-10-07 PROCEDURE — 99213 OFFICE O/P EST LOW 20 MIN: CPT | Performed by: INTERNAL MEDICINE

## 2022-10-07 PROCEDURE — G8484 FLU IMMUNIZE NO ADMIN: HCPCS | Performed by: INTERNAL MEDICINE

## 2022-10-07 PROCEDURE — 1036F TOBACCO NON-USER: CPT | Performed by: INTERNAL MEDICINE

## 2022-10-07 PROCEDURE — G8420 CALC BMI NORM PARAMETERS: HCPCS | Performed by: INTERNAL MEDICINE

## 2022-10-07 PROCEDURE — 3017F COLORECTAL CA SCREEN DOC REV: CPT | Performed by: INTERNAL MEDICINE

## 2022-10-07 PROCEDURE — G8427 DOCREV CUR MEDS BY ELIG CLIN: HCPCS | Performed by: INTERNAL MEDICINE

## 2022-10-07 RX ORDER — CHLORAL HYDRATE 500 MG
1 CAPSULE ORAL DAILY
COMMUNITY

## 2022-10-07 RX ORDER — ZOLPIDEM TARTRATE 6.25 MG/1
TABLET, FILM COATED, EXTENDED RELEASE ORAL
COMMUNITY
Start: 2022-09-08

## 2022-10-07 ASSESSMENT — ENCOUNTER SYMPTOMS
COUGH: 0
WHEEZING: 0
SHORTNESS OF BREATH: 0
ABDOMINAL PAIN: 0
DIARRHEA: 0
CHEST TIGHTNESS: 0
NAUSEA: 0
CONSTIPATION: 0
VOMITING: 0

## 2022-10-07 NOTE — PROGRESS NOTES
[address] [facility]. [address], [address] [facility]  [phone]    Patient Name:  Elisa Torres  YOB: 1945      Office Visit 10/7/2022    CHIEF COMPLAINT:    Chief Complaint   Patient presents with    Follow-up         HISTORY OF PRESENT ILLNESS:     Previously seen by:  Dr. 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 Karla Hinton with positive N1 lymph node at stage  IIb who in December had an enlarged mesenteric lymph node ended up following with [facility] and underwent resection of the lymph node apparently at the time. He had a follow-up CT scan of the abdomen and pelvis which reveals unfortunately the lymph  nodes to be now doubled in size perhaps it is a second lymph node but the pathology according to the patient from his resection in December was benign lymph nodes. I unfortunately do not have access to the official pathology report from [facility] in  regards to the lymph nodes that were removed at the time. Patient feels well, he has no complaints he is recovered from the surgery very well he has no issues with diarrhea flushing intermittent pallor or sweatiness or anxiety attacks. He has no complaints  today. ASSESSMENT:  (Medical Decision Making)                   ICD-10-CM  ICD-9-CM             1.  Neuroendocrine tumor   D3A.8  209.60       2. Lymphadenopathy   R59.1  785.6       3. Lymphoma, unspecified body region, unspecified lymphoma type ([address]   C85.90  202.80             4.  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   I instructed the patient to get in touch with [facility] we will try to send the images to [facility] physicians so that they can review and formulate strategic  plan I suspect that this lesion will need to be removed it seems to have doubled in size. Any questions asked were answered seemingly to the patient satisfaction, he will return to us on an as-needed basis and will need to follow-up with [facility]  as soon as possible. PLAN:   We discussed the expected course, resolution and complications of the diagnosis(es) in detail. Medication risks, benefits, costs, interactions, and alternatives were discussed as indicated. I advised  the patient to contact the office if his/her condition worsens, changes or fails to improve as anticipated. The patient expressed understanding with the diagnosis(es) and plan. March 22, 2022:      The patient has done relatively well since his last visit, he seems to have his carcinoid tumor in the chest in remission however he has been since diagnosed with lymphoma and there is signs of some progression of the lymphoma with lymph nodes enlarging  in the supraclavicular region apparently and therefore he is due to see [facility] for further testing scans and establishment of a treatment/care plan. He is fully vaccinated for COVID with 4 vaccinations and has no complaints today. October 7, 2022     ASSESSMENT:  (Medical Decision Making)                   ICD-10-CM  ICD-9-CM             1.  Neuroendocrine tumor   D3A.8  209.60       2. Lymphadenopathy   R59.1  785.6       3. Lymphoma, unspecified body region, unspecified lymphoma type ([address]   C85.90  202.80             4.  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       Ongoing lymphoma based on lymph node biopsy with some evidence of progression of lymphadenopathy, [facility] and due to see them soon in order to establish further diagnostics and plans of care. The patient will return to the office for  follow-up in 6 months to discuss his progress and thereafter likely will be seen by us on an as-needed basis. By that time we will have an idea of what final diagnosis we are dealing with in terms of the lymphoma and whether treatment needs to be started  and if started whether it needs to be continued in the setting of [facility] or with our oncologist here in [place]. 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 has been slowly progressing, discussions with oncology at [facility] yielded a watchful strategy rather than treatment since the progression is apparently slow however he has developed some issues with dysphagia and a mass has been identified in his neck for which he is planning to undergo resection and biopsy to determine the origin otherwise has been doing well, he has had COVID in May of this year despite being vaccinated and then boosted 4 times but he recovered very quickly within 5 days after receiving Paxil that for COVID and overall it took him about 3 weeks to recover fully. He has no complaints today. He denies any shortness of breath, hemoptysis, increased coughing.   Past Medical History:   Diagnosis Date    Heart abnormality     Hypercholesterolemia     Neuroendocrine carcinoma of lung [facility])          Patient Active Problem List   Diagnosis    Lung mass    S/P lobectomy of lung           Past Surgical History:   Procedure Laterality Date    CATARACT REMOVAL      LOBECTOMY Left     ORTHOPEDIC SURGERY      right hand surgery    TONSILLECTOMY           Social History     Socioeconomic History    Marital status:      Spouse name: Not on file Number of children: Not on file    Years of education: Not on file    Highest education level: Not on file   Occupational History    Not on file   Tobacco Use    Smoking status: Former     Packs/day: 0.25     Types: Cigarettes     Quit date: 1968     Years since quittin.8    Smokeless tobacco: Never   Substance and Sexual Activity    Alcohol use: No    Drug use: No    Sexual activity: Not on file   Other Topics Concern    Not on file   Social History Narrative    Not on file     Social Determinants of Health     Financial Resource Strain: Not on file   Food Insecurity: Not on file   Transportation Needs: Not on file   Physical Activity: Not on file   Stress: Not on file   Social Connections: Not on file   Intimate Partner Violence: Not on file   Housing Stability: Not on file         Family History   Problem Relation Age of Onset    Heart Disease Father     Heart Disease Brother     Cancer Brother         prostate    Cancer Father         prostate         Allergies   Allergen Reactions    Oxycodone-Acetaminophen Itching    Sulfa Antibiotics Itching    Sulfasalazine Other (See Comments)    Tyloxapol Other (See Comments)         Current Outpatient Medications   Medication Sig    Ubiquinol 100 MG CAPS take 1 capsule daily    Omega-3 Fatty Acids (FISH OIL) 1000 MG capsule Take 1 tablet by mouth daily    cyanocobalamin 1000 MCG tablet Take 3,000 mcg by mouth daily    CPAP Machine MISC     zolpidem (AMBIEN CR) 6.25 MG extended release tablet     alfuzosin (UROXATRAL) 10 MG extended release tablet Take 10 mg by mouth daily    aspirin 81 MG EC tablet Take 81 mg by mouth daily    Cholecalciferol 50 MCG ( UT) TABS Take 1 tablet by mouth daily    dutasteride (AVODART) 0.5 MG capsule Take 0.5 mg by mouth daily    fluticasone (FLONASE) 50 MCG/ACT nasal spray 2 sprays by Nasal route daily as needed    pseudoephedrine (SUDAFED) 30 MG tablet Take by mouth every 4 hours as needed    simvastatin (ZOCOR) 40 MG tablet Take by mouth    tadalafil (CIALIS) 5 MG tablet Take 10 mg by mouth daily    doxepin (SINEQUAN) 10 MG/ML solution Take 10 mg by mouth (Patient not taking: Reported on 10/7/2022)     No current facility-administered medications for this visit. Review of Systems   Constitutional:  Negative for chills, fatigue, fever and unexpected weight change. Respiratory:  Negative for cough, chest tightness, shortness of breath and wheezing. Cardiovascular:  Negative for chest pain, palpitations and leg swelling. Gastrointestinal:  Negative for abdominal pain, constipation, diarrhea, nausea and vomiting. Neurological:  Negative for dizziness, tremors, seizures, weakness and headaches. PHYSICAL EXAM:    Vitals:    10/07/22 0904   BP: 138/84   Pulse: 93   Temp: 97.2 °F (36.2 °C)   SpO2: 98%        GENERAL APPEARANCE:   The patient is normal weight and in no respiratory distress. HEENT:   PERRL. Conjunctivae unremarkable. Nasal mucosa is without epistaxis, exudate, or polyps. Gums and dentition are unremarkable. There is no oropharyngeal narrowing. TMs are clear. NECK/LYMPHATIC:   Symmetrical with no elevation of jugular venous pulsation. Trachea midline. No thyroid enlargement. No cervical adenopathy. LUNGS:   Normal respiratory effort with symmetrical lung expansion. Breath sounds clear to auscultation bilaterally with no rhonchi wheezing or crackles. HEART:   There is a regular rate and rhythm. No murmur, rub, or gallop. There is no edema in the lower extremities. ABDOMEN:   Soft and non-tender. No hepatosplenomegaly. Bowel sounds are normal.     NEURO:   The patient is alert and oriented to person, place, and time. Memory appears intact and mood is normal.  No gross sensorimotor deficits are present.       DIAGNOSTIC TESTS:       Results for orders placed during the hospital encounter of 08/14/18     CT CHEST WO CONT           Narrative  CT OF THE CHEST WITHOUT CONTRAST HISTORY: Left chest pain after thoracotomy. COMPARISON: None      TECHNIQUE: A helical acquisition was performed through the chest utilizing 9.6BZ   slice thickness without intravenous contrast. Coronal and sagittal reformats   were obtained. Dose reduction techniques used: Automated exposure control, adjustment of the   mAs and/or kVp according to patient's size, and iterative reconstruction   techniques. FINDINGS:   *  PLEURA/PERICARDIUM: Moderate size left pleural effusion. *  LUNGS: Status post left lower lobectomy with minimal linear atelectasis of   the left upper lobe. *  ROB/MEDIASTINUM: Pneumomediastinum. *  TRACHEOBRONCHIAL TREE: Within normal limits. *  AORTA/PULMONARY VESSELS: Within normal limits. *  CORONARY ARTERIES: No coronary artery calcification is seen. *  CHEST WALL/AXILLA: Subcutaneous emphysema from recent thoracotomy. No   fractures. *  VISUALIZED UPPER ABDOMEN: Multiple gallstones. *  SPINE / BONES: Within normal limits. *  ADDITIONAL COMMENTS: None. Impression  IMPRESSION:      Subcutaneous emphysema and pneumomediastinum from recent thoracotomy. No   fractures are identified. Status post left lower lobectomy with minimal linear atelectasis of the left   upper lobe. Multiple gallstones. Date of Dictation: 8/14/2018 6:38 AM          ASSESSMENT:  (Medical Decision Making)     Patient Active Problem List   Diagnosis    Lung mass    S/P lobectomy of lung           PLAN:  Encounter Diagnoses   Name Primary? 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   The patient's follicular lymphoma is followed closely by [facility] and deemed to be progressing slowing of did not require any systemic therapy at this time as the patient tells me. He does have a new neck mass that is interfering with swallowing and he is due for surgery for resection and biopsy, recommendations if this is deemed to be due to lymphoma may change after that procedure. Overall from the standpoint of neuroendocrine tumor he is doing very well without any symptoms and recovered fully from his surgery. He will return to us for follow-up on an as-needed basis. He is following recommendations of [facility] in regards to boosting for COVID and has recently received his fourth booster which was omicron specific for which she was congratulated to mitigate for the COVID infections. Any questions asked were answered seemingly to his satisfaction, total time spent was 20 minutes, and he will return to the office for follow-up on an as-needed basis if his symptoms recur or if he needs any further procedures for diagnostic purposes. No orders of the defined types were placed in this encounter. No orders of the defined types were placed in this encounter. Manish Acevedo MD  Dictated using voice recognition software.  Proofread, but unrecognized voice recognition errors may exist.

## 2022-10-10 ENCOUNTER — TELEPHONE (OUTPATIENT)
Dept: PULMONOLOGY | Age: 77
End: 2022-10-10

## 2022-10-10 NOTE — TELEPHONE ENCOUNTER
Patient is asking for referral to see Florence Carlos at the [facility]    Is it ok to send this referral. ?

## 2022-10-12 ENCOUNTER — PATIENT MESSAGE (OUTPATIENT)
Dept: PULMONOLOGY | Age: 77
End: 2022-10-12

## 2022-10-12 DIAGNOSIS — Z90.2 S/P LOBECTOMY OF LUNG: ICD-10-CM

## 2022-10-12 DIAGNOSIS — R91.8 LUNG MASS: Primary | ICD-10-CM

## 2022-10-18 NOTE — TELEPHONE ENCOUNTER
From: Devon Fuller  To: Dr. Parsons [place]: 10/12/2022 10:30 AM EDT  Subject: Referral to Oncology    Dr. Ethan Plunkett,  Thanks so much for visiting with me last week. I did follow up with Oncology, requesting an appointment with Dr. Nadine Sousa. I was told the department needed a referral before being able to schedule a first appointment. They mentioned the referral could be done electronically from your office to theirs. Would you please send on a referral for me? As always, thank you so much for everything you have done for me over the last four years.   Best regards,  Cass Solis  [phone]

## 2022-10-21 DIAGNOSIS — N40.1 BENIGN PROSTATIC HYPERPLASIA WITH LOWER URINARY TRACT SYMPTOMS, SYMPTOM DETAILS UNSPECIFIED: Primary | ICD-10-CM

## 2022-10-24 ENCOUNTER — NURSE ONLY (OUTPATIENT)
Dept: UROLOGY | Age: 77
End: 2022-10-24

## 2022-10-24 DIAGNOSIS — N40.1 BENIGN PROSTATIC HYPERPLASIA WITH LOWER URINARY TRACT SYMPTOMS, SYMPTOM DETAILS UNSPECIFIED: ICD-10-CM

## 2022-10-25 ENCOUNTER — APPOINTMENT (RX ONLY)
Dept: URBAN - METROPOLITAN AREA CLINIC 23 | Facility: CLINIC | Age: 77
Setting detail: DERMATOLOGY
End: 2022-10-25

## 2022-10-25 DIAGNOSIS — L82.1 OTHER SEBORRHEIC KERATOSIS: ICD-10-CM

## 2022-10-25 DIAGNOSIS — Z71.89 OTHER SPECIFIED COUNSELING: ICD-10-CM

## 2022-10-25 DIAGNOSIS — D18.0 HEMANGIOMA: ICD-10-CM

## 2022-10-25 DIAGNOSIS — L57.8 OTHER SKIN CHANGES DUE TO CHRONIC EXPOSURE TO NONIONIZING RADIATION: ICD-10-CM

## 2022-10-25 PROBLEM — D18.01 HEMANGIOMA OF SKIN AND SUBCUTANEOUS TISSUE: Status: ACTIVE | Noted: 2022-10-25

## 2022-10-25 LAB — PSA SERPL-MCNC: 2.3 NG/ML

## 2022-10-25 PROCEDURE — 99213 OFFICE O/P EST LOW 20 MIN: CPT

## 2022-10-25 PROCEDURE — ? COUNSELING

## 2022-10-25 ASSESSMENT — LOCATION DETAILED DESCRIPTION DERM
LOCATION DETAILED: MID POSTERIOR NECK
LOCATION DETAILED: RIGHT SUPERIOR MEDIAL MIDBACK
LOCATION DETAILED: LEFT MEDIAL INFERIOR CHEST
LOCATION DETAILED: EPIGASTRIC SKIN
LOCATION DETAILED: RIGHT PROXIMAL DORSAL FOREARM
LOCATION DETAILED: LEFT PROXIMAL DORSAL FOREARM

## 2022-10-25 ASSESSMENT — LOCATION SIMPLE DESCRIPTION DERM
LOCATION SIMPLE: ABDOMEN
LOCATION SIMPLE: CHEST
LOCATION SIMPLE: LEFT FOREARM
LOCATION SIMPLE: RIGHT LOWER BACK
LOCATION SIMPLE: RIGHT FOREARM
LOCATION SIMPLE: POSTERIOR NECK

## 2022-10-25 ASSESSMENT — LOCATION ZONE DERM
LOCATION ZONE: TRUNK
LOCATION ZONE: NECK
LOCATION ZONE: ARM

## 2022-10-25 NOTE — HPI: FULL BODY SKIN EXAMINATION
What Is The Reason For Today's Visit?: Annual Full Body Skin Examination with No Concerns
What Is The Reason For Today's Visit? (Being Monitored For X): concerning skin lesions on a periodic basis
How Severe Are Your Spot(S)?: moderate

## 2022-10-25 NOTE — PROCEDURE: MIPS QUALITY
Quality 110: Preventive Care And Screening: Influenza Immunization: Influenza Immunization previously received during influenza season
Quality 111:Pneumonia Vaccination Status For Older Adults: Pneumococcal vaccine (PPSV23) administered on or after patient’s 60th birthday and before the end of the measurement period
Detail Level: Detailed
Quality 130: Documentation Of Current Medications In The Medical Record: Current Medications Documented

## 2022-10-31 ENCOUNTER — OFFICE VISIT (OUTPATIENT)
Dept: UROLOGY | Age: 77
End: 2022-10-31
Payer: MEDICARE

## 2022-10-31 DIAGNOSIS — N52.9 ERECTILE DYSFUNCTION, UNSPECIFIED ERECTILE DYSFUNCTION TYPE: ICD-10-CM

## 2022-10-31 DIAGNOSIS — N40.1 BENIGN PROSTATIC HYPERPLASIA WITH LOWER URINARY TRACT SYMPTOMS, SYMPTOM DETAILS UNSPECIFIED: Primary | ICD-10-CM

## 2022-10-31 LAB
BILIRUBIN, URINE, POC: NEGATIVE
BLOOD URINE, POC: NORMAL
GLUCOSE URINE, POC: NEGATIVE
KETONES, URINE, POC: NEGATIVE
LEUKOCYTE ESTERASE, URINE, POC: NEGATIVE
NITRITE, URINE, POC: NORMAL
PH, URINE, POC: 7 (ref 4.6–8)
PROTEIN,URINE, POC: NEGATIVE
SPECIFIC GRAVITY, URINE, POC: 1.01 (ref 1–1.03)
URINALYSIS CLARITY, POC: NORMAL
URINALYSIS COLOR, POC: NORMAL
UROBILINOGEN, POC: 0.2

## 2022-10-31 PROCEDURE — 3017F COLORECTAL CA SCREEN DOC REV: CPT | Performed by: UROLOGY

## 2022-10-31 PROCEDURE — G8420 CALC BMI NORM PARAMETERS: HCPCS | Performed by: UROLOGY

## 2022-10-31 PROCEDURE — G8484 FLU IMMUNIZE NO ADMIN: HCPCS | Performed by: UROLOGY

## 2022-10-31 PROCEDURE — 99214 OFFICE O/P EST MOD 30 MIN: CPT | Performed by: UROLOGY

## 2022-10-31 PROCEDURE — 1036F TOBACCO NON-USER: CPT | Performed by: UROLOGY

## 2022-10-31 PROCEDURE — G8427 DOCREV CUR MEDS BY ELIG CLIN: HCPCS | Performed by: UROLOGY

## 2022-10-31 PROCEDURE — 1123F ACP DISCUSS/DSCN MKR DOCD: CPT | Performed by: UROLOGY

## 2022-10-31 PROCEDURE — 81003 URINALYSIS AUTO W/O SCOPE: CPT | Performed by: UROLOGY

## 2022-10-31 RX ORDER — DUTASTERIDE 0.5 MG/1
0.5 CAPSULE, LIQUID FILLED ORAL DAILY
Qty: 90 CAPSULE | Refills: 3 | Status: SHIPPED | OUTPATIENT
Start: 2022-10-31

## 2022-10-31 RX ORDER — TADALAFIL 5 MG/1
5 TABLET ORAL DAILY
Qty: 90 TABLET | Refills: 3 | Status: SHIPPED | OUTPATIENT
Start: 2022-10-31

## 2022-10-31 RX ORDER — ALFUZOSIN HYDROCHLORIDE 10 MG/1
10 TABLET, EXTENDED RELEASE ORAL DAILY
Qty: 90 TABLET | Refills: 3 | Status: SHIPPED | OUTPATIENT
Start: 2022-10-31

## 2022-10-31 ASSESSMENT — ENCOUNTER SYMPTOMS: BACK PAIN: 0

## 2022-10-31 NOTE — PROGRESS NOTES
[facility]  [address] [facility] [address], [address] [facility]  [phone]          Maurice Rock  : 1945    Chief Complaint   Patient presents with    Follow-up          HPI     Maurice Rock is a 78 y.o. male  (Abebe Werner) who transferred care in 10/19 from Dr. Alex Randolph. He is s/p L lobectomy at [facility] 18 due to carcinoid. He did have a + node. Pet Scan thereafter revealed a ? 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 Hillary Jacques [facility]. It returned negative. He is on alfuzosin/dutasteride/cialis 5mg in regards to LUTS. Voiding is satisfactory. He has some minor dribbling. 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 He has been diagnosed with a follicular lymphoma, found as intraperitoneal enlarged lymph node. It is followed by Dr. Soila Gupta, [facility].       PSA:  2.2, 10/20 2.1, 10/21 2.9, 10/22 2.3          Past Medical History:   Diagnosis Date    Heart abnormality     Hypercholesterolemia     Neuroendocrine carcinoma of lung ([address]      Past Surgical History:   Procedure Laterality Date    CATARACT REMOVAL      LOBECTOMY Left     ORTHOPEDIC SURGERY      right hand surgery    TONSILLECTOMY       Current Outpatient Medications   Medication Sig Dispense Refill    dutasteride (AVODART) 0.5 MG capsule Take 1 capsule by mouth daily 90 capsule 3    alfuzosin (UROXATRAL) 10 MG extended release tablet Take 1 tablet by mouth daily 90 tablet 3    tadalafil (CIALIS) 5 MG tablet Take 1 tablet by mouth daily 90 tablet 3    Ubiquinol 100 MG CAPS take 1 capsule daily      Omega-3 Fatty Acids (FISH OIL) 1000 MG capsule Take 1 tablet by mouth daily      cyanocobalamin 1000 MCG tablet Take 3,000 mcg by mouth daily      CPAP Machine MISC       zolpidem (AMBIEN CR) 6.25 MG extended release tablet       aspirin 81 MG EC tablet Take 81 mg by mouth daily      Cholecalciferol 50 MCG ( UT) TABS Take 1 tablet by mouth daily      doxepin (SINEQUAN) 10 MG/ML solution Take 10 mg by mouth      fluticasone (FLONASE) 50 MCG/ACT nasal spray 2 sprays by Nasal route daily as needed      pseudoephedrine (SUDAFED) 30 MG tablet Take by mouth every 4 hours as needed      simvastatin (ZOCOR) 40 MG tablet Take by mouth       No current facility-administered medications for this visit. Allergies   Allergen Reactions    Oxycodone-Acetaminophen Itching    Sulfa Antibiotics Itching    Sulfasalazine Other (See Comments)    Tyloxapol Other (See Comments)     Social History     Socioeconomic History    Marital status:      Spouse name: Not on file    Number of children: Not on file    Years of education: Not on file    Highest education level: Not on file   Occupational History    Not on file   Tobacco Use    Smoking status: Former     Packs/day: 0.25     Types: Cigarettes     Quit date: 1968     Years since quittin.8    Smokeless tobacco: Never   Substance and Sexual Activity    Alcohol use: No    Drug use: No    Sexual activity: Not on file   Other Topics Concern    Not on file   Social History Narrative    Not on file     Social Determinants of Health     Financial Resource Strain: Not on file   Food Insecurity: Not on file   Transportation Needs: Not on file   Physical Activity: Not on file   Stress: Not on file   Social Connections: Not on file   Intimate Partner Violence: Not on file   Housing Stability: Not on file     Family History   Problem Relation Age of Onset    Heart Disease Father     Heart Disease Brother     Cancer Brother         prostate    Cancer Father         prostate       Review of Systems  Constitutional:   Negative for fever. Genitourinary:  Negative for flank pain. Musculoskeletal:  Negative for back pain.     Urinalysis  UA - Dipstick  Results for orders placed or performed in visit on 10/31/22   AMB POC URINALYSIS DIP STICK AUTO W/O MICRO   Result Value Ref Range Color, Urine, POC      Clarity, Urine, POC      Glucose, Urine, POC Negative Negative    Bilirubin, Urine, POC Negative Negative    Ketones, Urine, POC Negative Negative    Specific Gravity, Urine, POC 1.015 1.001 - 1.035    Blood, Urine, POC neg Negative    pH, Urine, POC 7.0 4.6 - 8.0    Protein, Urine, POC Negative Negative    Urobilinogen, POC 0.2     Nitrate, Urine, POC neg Negative    Leukocyte Esterase, Urine, POC Negative Negative       UA - Micro  WBC - 0  RBC - 0  Bacteria - 0  Epith - 0    There were no vitals taken for this visit. GENERAL: NAD, ALERT, A&O x 3, GAIT NORMAL  LUNGS: easy work of breathing  ABDOMEN: soft, non tender  MAME: 1+ no nod;le  NEUROLOGIC: cranial nerves 2-12 grossly intact       Assessment and Plan    ICD-10-CM    1. Benign prostatic hyperplasia with lower urinary tract symptoms, symptom details unspecified  N40.1 AMB POC URINALYSIS DIP STICK AUTO W/O MICRO     dutasteride (AVODART) 0.5 MG capsule     alfuzosin (UROXATRAL) 10 MG extended release tablet     tadalafil (CIALIS) 5 MG tablet     PSA, Diagnostic     PSA, Diagnostic      2. Erectile dysfunction, unspecified erectile dysfunction type  N52.9 tadalafil (CIALIS) 5 MG tablet          In regards to bph, alfuzosin, dutasteride, tadalafil was refilled. Tadalafil helps in regards to bph and ed. Pt. will follow up in 12 months with psa for MAME.

## 2022-11-17 ENCOUNTER — TELEPHONE (OUTPATIENT)
Dept: PULMONOLOGY | Age: 77
End: 2022-11-17

## 2022-11-18 NOTE — TELEPHONE ENCOUNTER
Contacted patient regarding update, he is reporting that per conversation w/ Dr. Geovanna Patel he will be moving to oncology to determine bulk of care, but in appreciation of timeless investments would like to thank Madyson. Advised msg will be relayed.

## 2022-11-29 NOTE — PROGRESS NOTES
New Patient Abstract    Reason for Referral: Lung mass    Referring Provider:  Rick Del Toro MD    Primary Care Provider: Leobardo Samaniego MD    Family History of Cancer/Hematologic Disorders: Father and brother with prostate cancer    Presenting Symptoms: originally none-incidental findings    Narrative with recent with Results/Procedures/Biopsies and Dates completed:   Mr. Anni Garcia is a 46-year-old  male who reports to be a former smoker of 0.25 pack per day of cigarettes that quit in 1968. He denies use of alcohol or drug substances. His medical history reports as heart abnormality, hypercholesterolemia and neuroendocrine carcinoma of lung. His surgical history reports as cataract removal, left lobectomy, hand and tonsillectomy. Mr. Anni Garcia has a history of stage IIB/[id number] neuroendocrine lung cancer from 2018. This was originally discovered incidentally by a calcium scoring CT scan of chest that reported a 3 cm lung mass in the left lower lobe. He had a bronchoscopy by Dr Isidro Vega in June 2018 in which pathology reported as a neuroendocrine tumor. He saw Dr Isai Harris for surgery consult and the recommendation was for left lower lobectomy. He went to the [facility] for second opinion. On 8/1/2018 he underwent a robotic assisted thoracoscopy left lower lobectomy with mediastinal lymphadenectomy by Dr Kiersten Leblanc at the [facility]. Final pathology reported a typical carcinoid with a single intrapulmonary lymph node positive for metastatic tumor. No systemic chemotherapy was recommended and he was to maintain in observation with scheduled imaging. His November 2018 PET scan revealed a possible lytic lesion on rib and avid uptake of the prostate. He underwent a CT scan to better evaluate the rib lesion which reported no concern. He then underwent a biopsy of his prostate on 12/5/2018 by Dr Annelle Libman at [facility]. Fortunate for him the pathology reported prostate tissue as benign.  He remained in observation with pulmonary for lung cancer and urology for his prostate. In October 2019 he had a CT scan of chest abdomen and pelvis that reported an increase in size of multiple mesenteric lymph nodes with a general haziness to the small bowel mesentery. He had a NETSPOT PET scan on 11/5/2019 that reported mild avid lymph node however nodes were larger and sampling was recommended. On 12/20/2019 he underwent a biopsy of mesenteric lymph node by at [facility]. The pathology reported 2 of the lymph nodes a benign however the 3rd lymph node reported as Follicular lymphoma, grade 1-2. The IHC staining of that positive lymph node reported nodules of lymphocytes were CD20-positive B-cells that showed kappa light chain restriction and coexpressed CD10, BCL6, and aberrant expression of BCL2. His 4/2020 CT scan of chest/abdomen/pelvis reported disease progression by solitary enlarging small bowel mesenteric lymph node. He saw Dr Alida Keene in May 2020 who recommended a surgical excision of lymph node for diagnostic purposes. He decided to go to [facility] for an opinion. In June 2020 he met with hematologist Dr Maribel Warner. A bone marrow biopsy was not recommended due to no cytopenia. His 6/2020 LD and [id number] reported normal. His 6/2020 hepatitis and HIV reported negative. He chose observation. In 9/2020 he had follow up with hematologist and plan was for yearly scans and every 6 months blood work. In April 2021 he saw ENT at [facility] and had a FNA of thyroid nodule. The cytology reported as benign. He was offered an excision however he chose monitoring. Plan to repeat ultrasound in 1 year and laryngoscopy in 6 months. In May 2022 he saw Dr Rebecca Noel at [facility] with otorhinolaryngology for opinion on his laryngeal mass/lesion. His 5/17/22 CT neck soft tissue scan reported fluid collection by hypopharynx and increased adenopathy with possible progression of lymphoma. Plan for observation with scans for hypopharynx.  In May 2022 he saw  Yasmani Campa at [facility] with hematology for a CT scan of chest that reported increased lymph nodes and CT of abdomen and pelvis that reported increased retroperitoneal nodes with normal spleen. Per clinic note 5/17/22, labs were stable and no complaints of night sweats. Imaging was reviewed and although increase in size of lymph nodes most were within normal limits. Hematology plan was to continue labs and imaging in 1 year however he was to continue medical oncology follow up for his lung cancer. In May 2022 he met with Dr Vilma Kohler with orthopedic surgery at [facility] for his shoulder/rotator cuff issue. He decided to forgo surgery at that time and try injections for the pain. He received his first injection that day and was to continue care with his local orthopedist. Next appointment at [facility] for Hematology planned for May 2023. In October 2022, he presented to Dr Jeff Escamilla for follow up on lung carcinoid. He reported close observation with [facility] for his lymphoma. He requested a referral to . Referral placed as lung mass however per documentation he is seeking opinion on his Lymphoma. His 11/3/22 [facility] reported an elevated wBC of 12.6 with a decreased hematocrit of 37.8 and absolute lymphocytes of 0.8 otherwise WNL. Pathology   6/7/2018 Surgical Pathology ([facility])  \"LLL NODULE BIOPSY\":  NEUROENDOCRINE TUMOR. SEE COMMENT. Comment      Morphologic and immunohistochemical features are consistent with   neuroendocrine differentiation. Immunohistochemical slides for Ki-67   (cell proliferation nuclear antigen) demonstrates only scattered cells   with nuclear staining. These features are suggestive of a low-grade   neuroendocrine tumor. Clinical correlation is recommended.      [facility]-IMMUNOHISTOCHEMISTRY               Status:  Signed Out   Interpretation   Immunohistochemical Stain Panel:   Interpretation:  Immunohistochemical slides demonstrate features   consistent with neuroendocrine differentiation. Antibody/Test                                                  Marker For                         Result   Pancytokeratin (AE1/AE3)     Broad spectrum epithelial marker              Weakly positive   Synaptophysin               Neural and neuroendocrine tumors                    Strongly positive   CD56                    Neuroendocrine differentiation                                     Strongly positive   TTF-1                    Lung adenocarcinoma and thyroid carcinomas           Positive, nuclear pattern   Ki-67                    Cell proliferation, nuclear antigen                                 Scattered positive cells,nuclear pattern     CD45                    Leukocyte common antigen                                          Rare positive cells    8/1/2018 Surgical Pathology [facility])  FINAL DIAGNOSIS   A. Lymph node, left interlobar (station 11L), biopsy:   Fragments of lymph node, negative for tumor. B.  Lymph node, left interlobar (station 11L) set 2,   biopsy:  Fragments of lymph node, negative for tumor. C.  Lymph node, left interlobar (station 11L) set 3,   biopsy:  Fragments of lymph node, negative for tumor. D. Lymph node, subaortic (station 5), biopsy:  Fragments of   lymph node, negative for tumor. E.  Lymph node, subcarinal (station 7), biopsy:  Fragments   of lymph node, negative for tumor. Blanca Kaplan, left lower lobe, lobectomy:  Typical carcinoid   tumor forming a circumscribed, solid, central mass (2.1 x   1.7 x 1.5 cm). The bronchial margin is negative for tumor   by 2.1 cm. A single (1 of 4) intrapulmonary peribronchial    lymph node is positive for metastatic carcinoid tumor. See synoptic report. SYNOPTIC REPORT   Procedure: Lobectomy. Specimen Laterality: Left. Tumor Site: Lower lobe. Total Tumor Size: 2.1 cm in greatest dimension. Additional dimensions:  1.7 x 1.5 cm. Tumor Focality: Single tumor.    Histologic Type: Typical carcinoid tumor. Histologic Grade: Well differentiated. Visceral Pleural Invasion: Not identified. Lymphovascular Invasion: Present. Direct Invasion of Adjacent Structures: No adjacent   structures present. Margins: All margins are uninvolved by carcinoma. Margins examined:  Bronchial, parenchymal resection. Distance of invasive carcinoma from closest margin:   2.0 cm. Specify closest margin:  Stapled parenchymal resection   margin. Bronchial Margin:  Uninvolved by invasive carcinoma. Vascular Margin:  Not applicable. Parenchymal Margin:  Uninvolved by invasive carcinoma. Treatment Effect: No known presurgical therapy. Regional Lymph Nodes        Number of Lymph Nodes Involved: 1        Number of Lymph Nodes Examined: 9   Pathologic Staging (AJCC, 8th edition)        TNM Descriptors: Not applicable. Primary Tumor: pT1c:        Regional lymph nodes: pN1:        Distant Metastasis: Not applicable. 12/4/2018 Surgical Pathology ([place])   FINAL DIAGNOSIS   A. Prostate, right side, needle core biopsy:  Benign   prostatic tissue. B.  Prostate, left side, needle core biopsy:  Benign   prostatic tissue. C.  Prostate, left transition zone, needle core biopsy:   Benign prostatic tissue. D.  Prostate, right transition zone, needle core biopsy:   Benign prostatic tissue. 12/20/2019 Surgical Pathology ([place])   REVISION DESCRIPTION   Changing the diagnosis on part C (lymph node, mesenteric   No.   3, biopsy) and adding description. Underlining in the PDF report indicates revision. FINAL DIAGNOSIS   A. Lymph node, mesenteric, biopsy:  A single (1) lymph node   is negative for tumor. B. Lymph node, mesenteric No. 2, biopsy:  A single (1)   lymph node is negative for tumor. C. Lymph node, mesenteric No. 3, biopsy: Follicular   lymphoma, grade 1-2.    Sections show biopsy of lymph node composed of nodular   aggregates of small lymphocytes with oval nuclei and   condensed chromatin. No significant large cell population   is present (<5 centroblasts per high-power field). Immunoperoxidase studies were performed at [facility] in   [place], [place], on paraffin sections of the mesenteric No. 3   lymph node (block C1) using antibodies directed against the   following antigens:  CD3, CD5, CD10, CD20, CD23, CD43,   BCL2,   BCL6, cyclin D1, IgD, and kappa and lambda immunoglobulin   light chains. Stains demonstrate that the nodules of lymphocytes are   CD20-positive B-cells that show kappa light chain   restriction and coexpress CD10, BCL6, and show aberrant   expression of BCL2. These grow in association with focal   [id number] follicular dendritic meshworks. The   remainder   of the stains tested are negative within the nodules. This final pathology report is based on the   gross/macroscopic examination, the frozen section   histologic   evaluation of the specimen(s), and review of the   Hematoxylin   and Eosin (H&E) permanent sections. Any revised   information   from the preliminary report is underlined. 2021 Cytology [facility]     [address] [facility]   Patient:   El Montaño     :       1945     MRN:          FIN:       [id number]     Provider:  Humphrey Pearson                                             Aspiration Cytology Report       Accession #:                                       [id number]   Collection Date/Time:                              2021 13:30 EDT                                 Aspiration Cytology Report - 2021 08:51 EDT   Clinical Information:   Right thyroid nodule. DIAGNOSIS:     THYROID, RIGHT NODULE, FINE NEEDLE ASPIRATION:      . BETHESDA CATEGORY 2 (TWO). . CONSISTENT WITH A BENIGN THYROID NODULE         Imaging   2018  PET/CT ([place])  IMPRESSION:     1.  High suspicion for prostate carcinoma with neuroendocrine features   particularly given reported strong family history of prostate carcinoma. Recommend prostate MRI for further evaluation. Krenning score:  3   2. Small foci of right rib activity indeterminate for metastases, suspect more   likely related to the aforementioned prostate findings as opposed to the   patient's history of bronchial carcinoid. 11/14/2018 CT Chest [facility])  IMPRESSION:   1. Interval PO changes of a left lower lobectomy. 2. No rib abnormalities to correspond to the areas of uptake on the PET/CT. However, this is likely due to differences in sensitivity between the   techniques    10/25/2019 CT Chest/abdomen/pelvis ([facility])  IMPRESSION:   1. Increase in size and number of multiple mesenteric lymph nodes with a general haziness to the small bowel mesentery. The findings are nonspecific. Metastatic disease is not excluded. Could consider further evaluation with PET/CT to evaluate the metabolic activity of these lymph nodes. 2. Postsurgical changes in keeping with prior left upper lobectomy. No findings to suspect disease recurrence of the chest.   3. Cholelithiasis   4. Right hepatic lobe cyst.   5. Enlarged prostate gland. 6. Sigmoid colon diverticulosis. 11/5/2019 PET/CT NETSPOT [facility])  Addendum: Of all of the patient's prior studies loaded to the PACS only   the CT abdomen of 4/15/2019 completely image the mesenteric lymph nodes in the abdomen. The enlarged node that is mildly tracer avid in the small bowel mesentery   on this study measures larger than it did. On the prior study this   measured up to 9 mm compared to 13 mm now. Probably tissue sampling/resection of this would be needed for   confirmation of its etiology  IMPRESSION:   1. No evidence for recurrent disease or disease progression in the chest.   One RIGHT rib focus: Posteriorly at the sixth rib again shows mild Dotatate tracer activity-indeterminate.    In the abdomen the \"robert mesentery \" appearance at the small bowel mesentery appears to persist.  One lymph node is now enlarged measuring 13 mm and shows mild but clearly evident uptake of tracer. This is indeterminant, a metastasis cannot be   excluded. Recommendation-if possible obtained the prior imaging studies for for direct comparison at this site. We would be happy to add an addendum to this report if this is possible. 2.  Again there is prostatomegaly and heterogeneous increased tracer uptake in the prostate gland. A specific size for any lymph node in this region. No other convincing site of abnormal tracer activity seen    4/27/2020 CT Chest Abdomen Pelvis ([facility])  IMPRESSION:   1. Disease progression as evidenced by solitary enlarging small bowel mesenteric lymph node     5/17/2022 CT Neck Soft Tissue with IV Contrast ([place])  Impression: 1. Apparently submucosal fluid collection along the posterior wall of the hypopharynx has increased in size since 07/09/2020. This is nonspecific in appearance but may represent a lymphocele or lymphatic malformation. A tiny cyst along the left aryepiglottic fold stable. 2. Increased left low cervical, supraclavicular, axillary adenopathy suggesting lymphoma progression. 5/17/2022 CT Chest with IV Contrast ([place])  Impression: 1. Mildly increased size of retrocrural and left axillary/subpectoral lymph nodes. 2. This examination was performed in conjunction with a CT of the neck and abdomen, which will be reported separately. 5/17/2022 CT Abdomen Pelvis with IV Contrast ([place])  Impression: 1. The spleen is not enlarged. 2. Increased size of the retroperitoneal lymph nodes compared to 12/1/2021. 3. Decreased size of a bulky dominant mesenteric lymph node however the remainder of the enlarged mesenteric lymph nodes appear stable in size compared to 12/1/2021.        Labs    6/2020          11/3/22      Notes from Referring Provider: n/a    Other Pertinent Information: The pathology from 12/2019 was has an addendum to include the follicular lymphoma diagnosis. It is unclear if he was aware or if there was a plan beyond repeat imaging in 6 months. Notes from [address] unavailable from telephone encounter on 12/24/19 until he returned in June 2020 (hematology).   He sees [facility] ENT, Dr Epi Dunne, for pyriform sinus mass and thyroid nodule  He saw Dr Gelene Aase with urology in 10/2022    Presented at Tumor Board: n/a

## 2022-11-29 NOTE — PROGRESS NOTES
[facility]: New Patient - Consultation    Chief Complaint   Patient presents with    New Patient     Reason for Referral: Lung mass  --> actually for follicular lymphoma monitoring locally   Referring Provider:  Jenelle Wu MD  Primary Care Provider: Jovany Loredo MD  Family History of Cancer/Hematologic Disorders: Father and brother with prostate cancer  Presenting Symptoms: originally none-incidental findings    History of Present Illness:  Mr. Sniai Mccormick is a 78 y.o. male who presents today in referral from Dr. Luis Abrams for consultation regarding lung mass, but actually here to establish local care for [facility] surveillance. The past medical history is significant for heart abnormality, HLD, and neuroendocrine carcinoma of lung. Sxhx: cataract removal, left lobectomy, hand and tonsillectomy. Mr. Sinai Mccormick has a history of stage IIB/R4pK0Y6 neuroendocrine lung cancer from 2018. This was originally discovered incidentally by a calcium scoring CT scan of chest that reported a 3 cm lung mass in the left lower lobe. He had a bronchoscopy by Dr Rosalva Escamilla in 6/2018 in which pathology reported as a neuroendocrine tumor. He saw Dr Rebekah Veras for surgery consult and the recommendation was for left lower lobectomy. [facility]. On [facility]1/2018 he underwent a robotic assisted thoracoscopy left lower lobectomy with mediastinal lymphaden[facility] Final pathology reported a typical carcinoid with a single intrapulmonary lymph node positive for metastatic tumor. No systemic chemotherapy was recommended and he was to maintain on observation with imaging. November 2018 PET scan showed possible lytic lesion on rib and avid uptake of the prostate. He underwent a CT scan to evaluate the rib lesion which reported no concern. He then underwent a biopsy of his prostate on 12/5/2018 [facility], which was benign.   He remained on imaging surveillance with pulm for lung and urology for prostate. 10/2019 CT scan of chest abdomen and pelvis reported increase in size of multiple mesenteric lymph nodes with a general haziness to the small bowel mesentery. He had a NETSPOT PET scan on 11/5/2019 that showed mild avid lymph node and increase in size of LN - bx was recommended. On 12/20/2019 he underwent a biopsy of mesenteric lymph node by at [facility] - 2 of the lymph nodes were benign and 3rd one was c/w Follicular lymphoma, grade 1-2. The IHC staining of that positive lymph node reported nodules of lymphocytes were CD20-positive B-cells that showed kappa light chain restriction and coexpressed CD10, BCL6, and aberrant expression of BCL2. His 4/2020 CT scan of chest/abdomen/pelvis reported disease progression by solitary enlarging small bowel mesenteric lymph node. He saw Dr Doll Peabody in 5/ 2020 who recommended a surgical excision of lymph node for diagnostic purposes. He decided to go to [facility] for an opinion. In 6/ 2020 he met with hematologist Dr Shelley Rangel. A bone marrow biopsy was not recommended due to no cytopenia. His 6/2020 LD and [id number] were normal.  6/2020 hepatitis and HIV were negative. He chose observation. In 9/2020 he had follow up with hematologist and plan was for yearly scans and every 6 months blood work. 4/2021 - saw ENT at [facility] and had a FNA of thyroid nodule. The cytology reported as benign. He was offered an excision however he chose monitoring. Plan to repeat ultrasound in 1 year and laryngoscopy in 6 months.      5/2022 - saw Dr Nancy Veliz at [facility] with otorhinolaryngology for opinion on his laryngeal mass/lesion. 5/17/22 CT neck soft tissue scan c/w fluid collection by hypopharynx and increased adenopathy with possible progression of lymphoma. Plan for observation with scans for hypopharynx.   In May 2022 he saw Dr Shelley Rangel at [facility] with hematology for a CT scan of chest that showed increased antigen) demonstrates only scattered cells   with nuclear staining. These features are suggestive of a low-grade   neuroendocrine tumor. Clinical correlation is recommended. STF-IMMUNOHISTOCHEMISTRY                Immunohistochemical Stain Panel:   Interpretation:  Immunohistochemical slides demonstrate features   consistent with neuroendocrine differentiation. Antibody/Test                                                  Marker For                         Result   Pancytokeratin (AE1/AE3)     Broad spectrum epithelial marker              Weakly positive   Synaptophysin               Neural and neuroendocrine tumors                    Strongly positive   CD56                    Neuroendocrine differentiation                                     Strongly positive   TTF-1                    Lung adenocarcinoma and thyroid carcinomas           Positive, nuclear pattern   Ki-67                    Cell proliferation, nuclear antigen                                 Scattered positive cells,nuclear pattern     CD45                    Leukocyte common antigen                                          Rare positive cells     8/1/2018 Surgical Pathology [address])  FINAL DIAGNOSIS   A. Lymph node, left interlobar (station 11L), biopsy:   Fragments of lymph node, negative for tumor. B.  Lymph node, left interlobar (station 11L) set 2,   biopsy:  Fragments of lymph node, negative for tumor. C.  Lymph node, left interlobar (station 11L) set 3,   biopsy:  Fragments of lymph node, negative for tumor. D. Lymph node, subaortic (station 5), biopsy:  Fragments of   lymph node, negative for tumor. E.  Lymph node, subcarinal (station 7), biopsy:  Fragments   of lymph node, negative for tumor. Valeen Double, left lower lobe, lobectomy:  Typical carcinoid   tumor forming a circumscribed, solid, central mass (2.1 x   1.7 x 1.5 cm). The bronchial margin is negative for tumor   by 2.1 cm.    A single (1 of 4) intrapulmonary peribronchial    lymph node is positive for metastatic carcinoid tumor. See synoptic report. SYNOPTIC REPORT   Procedure: Lobectomy. Specimen Laterality: Left. Tumor Site: Lower lobe. Total Tumor Size: 2.1 cm in greatest dimension. Additional dimensions:  1.7 x 1.5 cm. Tumor Focality: Single tumor. Histologic Type: Typical carcinoid tumor. Histologic Grade: Well differentiated. Visceral Pleural Invasion: Not identified. Lymphovascular Invasion: Present. Direct Invasion of Adjacent Structures: No adjacent   structures present. Margins: All margins are uninvolved by carcinoma. Margins examined:  Bronchial, parenchymal resection. Distance of invasive carcinoma from closest margin:   2.0 cm. Specify closest margin:  Stapled parenchymal resection   margin. Bronchial Margin:  Uninvolved by invasive carcinoma. Vascular Margin:  Not applicable. Parenchymal Margin:  Uninvolved by invasive carcinoma. Treatment Effect: No known presurgical therapy. Regional Lymph Nodes        Number of Lymph Nodes Involved: 1        Number of Lymph Nodes Examined: 9   Pathologic Staging (AJCC, 8th edition)        TNM Descriptors: Not applicable. Primary Tumor: pT1c:        Regional lymph nodes: pN1:        Distant Metastasis: Not applicable. 12/4/2018 Surgical Pathology ([place])    FINAL DIAGNOSIS   A. Prostate, right side, needle core biopsy:  Benign   prostatic tissue. B.  Prostate, left side, needle core biopsy:  Benign   prostatic tissue. C.  Prostate, left transition zone, needle core biopsy:   Benign prostatic tissue. D.  Prostate, right transition zone, needle core biopsy:   Benign prostatic tissue. 12/20/2019 Surgical Pathology ([place])    REVISION DESCRIPTION   Changing the diagnosis on part C (lymph node, mesenteric   No.   3, biopsy) and adding description. Underlining in the PDF report indicates revision.    FINAL DIAGNOSIS A. Lymph node, mesenteric, biopsy:  A single (1) lymph node   is negative for tumor. B. Lymph node, mesenteric No. 2, biopsy:  A single (1)   lymph node is negative for tumor. C. Lymph node, mesenteric No. 3, biopsy: Follicular   lymphoma, grade 1-2. Sections show biopsy of lymph node composed of nodular   aggregates of small lymphocytes with oval nuclei and   condensed chromatin. No significant large cell population   is present (<5 centroblasts per high-power field). Immunoperoxidase studies were performed at [facility] in   [place], [place], on paraffin sections of the mesenteric No. 3   lymph node (block C1) using antibodies directed against the   following antigens:  CD3, CD5, CD10, CD20, CD23, CD43,   BCL2,   BCL6, cyclin D1, IgD, and kappa and lambda immunoglobulin   light chains. Stains demonstrate that the nodules of lymphocytes are   CD20-positive B-cells that show kappa light chain   restriction and coexpress CD10, BCL6, and show aberrant   expression of BCL2. These grow in association with focal   [id number] follicular dendritic meshworks. The   remainder   of the stains tested are negative within the nodules. This final pathology report is based on the   gross/macroscopic examination, the frozen section   histologic   evaluation of the specimen(s), and review of the   Hematoxylin   and Eosin (H&E) permanent sections. Any revised   information   from the preliminary report is underlined.    2021 Cytology [facility]     [address], [place], [place]   Patient:   Renee Booker     :       1945     MRN:          FIN:       [id number]     Provider:  Sonja Lozoya                                             Aspiration Cytology Report       Accession #:                                       [id number]   Collection Date/Time:                              2021 13:30 EDT Aspiration Cytology Report - 4/28/2021 08:51 EDT   Clinical Information:   Right thyroid nodule. DIAGNOSIS:     THYROID, RIGHT NODULE, FINE NEEDLE ASPIRATION:      . BETHESDA CATEGORY 2 (TWO). . CONSISTENT WITH A BENIGN THYROID NODULE            Imaging   11/12/2018  PET/CT ([place])  IMPRESSION:     1. High suspicion for prostate carcinoma with neuroendocrine features   particularly given reported strong family history of prostate carcinoma. Recommend prostate MRI for further evaluation. Krenning score:  3   2. Small foci of right rib activity indeterminate for metastases, suspect more   likely related to the aforementioned prostate findings as opposed to the   patient's history of bronchial carcinoid. 11/14/2018 CT Chest [facility])  IMPRESSION:   1. Interval PO changes of a left lower lobectomy. 2. No rib abnormalities to correspond to the areas of uptake on the PET/CT. However, this is likely due to differences in sensitivity between the   techniques     10/25/2019 CT Chest/abdomen/pelvis ([facility])  IMPRESSION:   1. Increase in size and number of multiple mesenteric lymph nodes with a general haziness to the small bowel mesentery. The findings are nonspecific. Metastatic disease is not excluded. Could consider further evaluation with PET/CT to evaluate the metabolic activity of these lymph nodes. 2. Postsurgical changes in keeping with prior left upper lobectomy. No findings to suspect disease recurrence of the chest.   3. Cholelithiasis   4. Right hepatic lobe cyst.   5. Enlarged prostate gland. 6. Sigmoid colon diverticulosis. 11/5/2019 PET/CT [facility])  Addendum: Of all of the patient's prior studies loaded to the PACS only   the CT abdomen of 4/15/2019 completely image the mesenteric lymph nodes in the abdomen. The enlarged node that is mildly tracer avid in the small bowel mesentery   on this study measures larger than it did.   On the prior study this   measured up to 9 mm compared to 13 mm now. Probably tissue sampling/resection of this would be needed for   confirmation of its etiology  IMPRESSION:   1. No evidence for recurrent disease or disease progression in the chest.   One RIGHT rib focus: Posteriorly at the sixth rib again shows mild Dotatate tracer activity-indeterminate. In the abdomen the \"robert mesentery \" appearance at the small bowel mesentery appears to persist.  One lymph node is now enlarged measuring 13 mm and shows mild but clearly evident uptake of tracer. This is indeterminant, a metastasis cannot be   excluded. Recommendation-if possible obtained the prior imaging studies for for direct comparison at this site. We would be happy to add an addendum to this report if this is possible. 2.  Again there is prostatomegaly and heterogeneous increased tracer uptake in the prostate gland. A specific size for any lymph node in this region. No other convincing site of abnormal tracer activity seen     4/27/2020 CT Chest Abdomen Pelvis ([facility])  IMPRESSION:   1. Disease progression as evidenced by solitary enlarging small bowel mesenteric lymph node      5/17/2022 CT Neck Soft Tissue with IV Contrast ([place])  Impression: 1. Apparently submucosal fluid collection along the posterior wall of the hypopharynx has increased in size since 07/09/2020. This is nonspecific in appearance but may represent a lymphocele or lymphatic malformation. A tiny cyst along the left aryepiglottic fold stable. 2. Increased left low cervical, supraclavicular, axillary adenopathy suggesting lymphoma progression. 5/17/2022 CT Chest with IV Contrast ([place])  Impression: 1. Mildly increased size of retrocrural and left axillary/subpectoral lymph nodes. 2. This examination was performed in conjunction with a CT of the neck and abdomen, which will be reported separately. 5/17/2022 CT Abdomen Pelvis with IV Contrast ([place])  Impression: 1. The spleen is not enlarged.  2. Increased size of the retroperitoneal lymph nodes compared to 2021. 3. Decreased size of a bulky dominant mesenteric lymph node however the remainder of the enlarged mesenteric lymph nodes appear stable in size compared to 2021. Labs      2020           11/3/22       He sees [facility], Dr Epi Dunne, for pyriform sinus mass and thyroid nodule  He saw Dr Gelene Aase with urology in 10/2022    11/30/22 heme/onc consultation re follicular lymphoma         Family History   Problem Relation Age of Onset    Heart Disease Father     Heart Disease Brother     Cancer Brother         prostate    Cancer Father         prostate      Social History     Socioeconomic History    Marital status:      Spouse name: None    Number of children: None    Years of education: None    Highest education level: None   Tobacco Use    Smoking status: Former     Packs/day: 0.25     Types: Cigarettes     Quit date: 1968     Years since quittin.9    Smokeless tobacco: Never   Substance and Sexual Activity    Alcohol use: No    Drug use: No        Review of Systems   Constitutional:  Negative for appetite change, diaphoresis, fatigue, fever and unexpected weight change. HENT:   Negative for sore throat. Eyes:  Negative for eye problems and icterus. Respiratory:  Negative for cough, hemoptysis and shortness of breath. Cardiovascular:  Negative for chest pain, leg swelling and palpitations. Gastrointestinal:  Negative for abdominal distention, abdominal pain, blood in stool, constipation, diarrhea, nausea and vomiting. Endocrine: Negative for hot flashes. Genitourinary:  Negative for dysuria. Musculoskeletal:  Positive for arthralgias. Negative for back pain and gait problem. Skin:  Negative for itching, rash and wound. Neurological:  Negative for dizziness, extremity weakness, gait problem, headaches, light-headedness, numbness, seizures and speech difficulty. Hematological:  Negative for adenopathy.  Does not bruise/bleed easily. Psychiatric/Behavioral:  Positive for sleep disturbance. Negative for decreased concentration and depression. The patient is not nervous/anxious. Allergies   Allergen Reactions    Oxycodone-Acetaminophen Itching    Sulfa Antibiotics Itching    Sulfasalazine Other (See Comments)    Tyloxapol Other (See Comments)     Past Medical History:   Diagnosis Date    Heart abnormality     Hypercholesterolemia     Neuroendocrine carcinoma of lung ([facility] [address]      Past Surgical History:   Procedure Laterality Date    CATARACT REMOVAL      LOBECTOMY Left     ORTHOPEDIC SURGERY      right hand surgery    TONSILLECTOMY       Current Outpatient Medications   Medication Sig Dispense Refill    EPINEPHrine (EPIPEN) 0.3 MG/0.3ML SOAJ injection Inject 0.3 mg into the muscle as needed      traMADol-acetaminophen (ULTRACET) 37.5-325 MG per tablet       dutasteride (AVODART) 0.5 MG capsule Take 1 capsule by mouth daily 90 capsule 3    alfuzosin (UROXATRAL) 10 MG extended release tablet Take 1 tablet by mouth daily 90 tablet 3    tadalafil (CIALIS) 5 MG tablet Take 1 tablet by mouth daily 90 tablet 3    Ubiquinol 100 MG CAPS take 1 capsule daily      Omega-3 Fatty Acids (FISH OIL) 1000 MG capsule Take 1 tablet by mouth daily      cyanocobalamin 1000 MCG tablet Take 3,000 mcg by mouth daily      CPAP Machine MISC       zolpidem (AMBIEN CR) 6.25 MG extended release tablet       aspirin 81 MG EC tablet Take 81 mg by mouth daily      Cholecalciferol 50 MCG (2000 UT) TABS Take 1 tablet by mouth daily      fluticasone (FLONASE) 50 MCG/ACT nasal spray 2 sprays by Nasal route daily as needed      pseudoephedrine (SUDAFED) 30 MG tablet Take by mouth every 4 hours as needed      simvastatin (ZOCOR) 40 MG tablet Take by mouth      doxepin (SINEQUAN) 10 MG/ML solution Take 10 mg by mouth (Patient not taking: Reported on 11/30/2022)       No current facility-administered medications for this visit.        No flowsheet data found.    OBJECTIVE:  BP (!) 149/78 (Site: Right Upper Arm, Position: Sitting, Cuff Size: Medium Adult)   Pulse 93   Temp 98.1 °F (36.7 °C) (Oral)   Resp 16   Ht 5' 5.25\" (1.657 m) Comment: taken w/o shoes  Wt 147 lb 12.8 oz (67 kg)   SpO2 98%   BMI 24.41 kg/m²       ECOG PERFORMANCE STATUS - 0-Fully active, able to carry on all pre-disease performance without restriction. Pain - 0 - No pain/10. None/Minimal pain - not affecting QOL  reported some pains in his legs lately but not affecting his QOL     Fatigue - No flowsheet data found. Distress - No flowsheet data found. Physical Exam  Vitals reviewed. Constitutional:       General: He is not in acute distress. Appearance: Normal appearance. He is not ill-appearing. HENT:      Head: Normocephalic and atraumatic. Nose: Nose normal. No congestion. Mouth/Throat:      Mouth: Mucous membranes are moist.   Eyes:      General: No scleral icterus. Extraocular Movements: Extraocular movements intact. Conjunctiva/sclera: Conjunctivae normal.      Pupils: Pupils are equal, round, and reactive to light. Cardiovascular:      Rate and Rhythm: Normal rate and regular rhythm. Heart sounds: No murmur heard. Pulmonary:      Effort: Pulmonary effort is normal. No respiratory distress. Breath sounds: Normal breath sounds. No wheezing, rhonchi or rales. Abdominal:      General: Abdomen is flat. There is no distension. Palpations: Abdomen is soft. There is no mass. Tenderness: There is no abdominal tenderness. Musculoskeletal:         General: Normal range of motion. Cervical back: Normal range of motion. No rigidity. Right lower leg: No edema. Left lower leg: No edema. Lymphadenopathy:      Head:      Right side of head: No submental or submandibular adenopathy. Left side of head: No submental or submandibular adenopathy. Cervical: No cervical adenopathy.       Upper Body:      Right upper body: No supraclavicular or axillary adenopathy. Left upper body: No supraclavicular or axillary adenopathy. Lower Body: No right inguinal adenopathy. No left inguinal adenopathy. Skin:     General: Skin is warm and dry. Coloration: Skin is not jaundiced or pale. Findings: No bruising or rash. Neurological:      General: No focal deficit present. Mental Status: He is alert and oriented to person, place, and time. Motor: No weakness. Coordination: Coordination normal.      Gait: Gait normal.   Psychiatric:         Behavior: Behavior normal.         Thought Content: Thought content normal.        Labs:  No results found for this or any previous visit (from the past 168 hour(s)). Imaging: reviewed     PATHOLOGY:   per above from OSH     ASSESSMENT:     Diagnosis Orders   1. Anemia, unspecified type  CBC with Auto Differential    Comprehensive Metabolic Panel    Ferritin    Folate    Transferrin Saturation    Vitamin B12    Vitamin D 25 Hydroxy    Path Review, Smear      2. Vitamin D deficiency  CBC with Auto Differential    Comprehensive Metabolic Panel    Ferritin    Folate    Transferrin Saturation    Vitamin B12    Vitamin D 25 Hydroxy      3. Other general symptoms and signs  CBC with Auto Differential    Comprehensive Metabolic Panel    Ferritin    Folate    Transferrin Saturation    Vitamin B12    Vitamin D 25 Hydroxy      4. History of follicular lymphoma  CBC with Auto Differential    Comprehensive Metabolic Panel    Lactate Dehydrogenase          Mr. Jun Benz is here for evaluation of foicular lymphoma, to establish local care. 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 We discussed staging, grade as well as risk. We also reviewed indications for treatment.   He is a patient of Dr. Lauro Singh at [facility] in [place]. No current B symptoms. - planned CT is tomorrow   - anemia - drop in Hb to 12.6 noted on Nov OSH labs - will p/w nutritional labs today and smear; plts normal, wbc normal   - planning to establish surveillance sched: sees PCP in Nov, will FU with me in Feb, then [place] in May/Melany, then with me in [address], and back to PCP in Nov.      2. Neuroendocrine/carcinoid - s/p resection at [facility]   - path per above   - currently on surveillance     3. Laryngeal/hypolaryngeal lesion - path benign   - sees Dr Zoran Akhtar at [facility] - will be seen again in May 2023 at which time, he'll see Dr Heike Gross to determine if sx is needed/indicated. - plan for sleep study 6 mo prior to sx per records. RESUSCITATION DIRECTIVES/HOSPICE CARE: Full Support    RTC Feb for labs/FU or sooner as needed     MDM  Number of Diagnoses or Management Options  Anemia, unspecified type: established, worsening  Follicular lymphoma of lymph nodes of multiple regions, unspecified follicular lymphoma type ([facility] [address]): new, needed workup  Other general symptoms and signs: new, needed workup  Vitamin D deficiency: new, needed workup     Amount and/or Complexity of Data Reviewed  Clinical lab tests: ordered and reviewed  Tests in the radiology section of CPT®: reviewed  Obtain history from someone other than the patient: yes  Review and summarize past medical records: yes  Independent visualization of images, tracings, or specimens: yes    Risk of Complications, Morbidity, and/or Mortality  Presenting problems: moderate  Diagnostic procedures: moderate  Management options: moderate        Lab studies and imaging studies were personally reviewed. Pertinent old records were reviewed. All questions were asked and answered to the best of my ability. The patient verbalized understanding and agrees with the plan above. Documentation was sent to Dr. Toby Hinson for continuation of care.   Thank you, Dr. Toby Hinson, for the courtesy of this referral.        Kendra Etienne, [facility]ematology [facility] Oncology  [address] [place] [address]  Office : [phone]  Fax : [phone]

## 2022-11-30 ENCOUNTER — HOSPITAL ENCOUNTER (OUTPATIENT)
Dept: LAB | Age: 77
Discharge: HOME | End: 2022-12-03
Payer: MEDICARE

## 2022-11-30 ENCOUNTER — OFFICE VISIT (OUTPATIENT)
Dept: ONCOLOGY | Age: 77
End: 2022-11-30
Payer: MEDICARE

## 2022-11-30 VITALS
DIASTOLIC BLOOD PRESSURE: 78 MMHG | SYSTOLIC BLOOD PRESSURE: 149 MMHG | BODY MASS INDEX: 24.62 KG/M2 | TEMPERATURE: 98.1 F | RESPIRATION RATE: 16 BRPM | WEIGHT: 147.8 LBS | OXYGEN SATURATION: 98 % | HEART RATE: 93 BPM | HEIGHT: 65 IN

## 2022-11-30 DIAGNOSIS — C82.98 FOLLICULAR LYMPHOMA OF LYMPH NODES OF MULTIPLE REGIONS, UNSPECIFIED FOLLICULAR LYMPHOMA TYPE (HCC): Primary | ICD-10-CM

## 2022-11-30 DIAGNOSIS — R68.89 OTHER GENERAL SYMPTOMS AND SIGNS: ICD-10-CM

## 2022-11-30 DIAGNOSIS — D64.9 ANEMIA, UNSPECIFIED TYPE: ICD-10-CM

## 2022-11-30 DIAGNOSIS — E55.9 VITAMIN D DEFICIENCY: ICD-10-CM

## 2022-11-30 LAB
25(OH)D3 SERPL-MCNC: 30.3 NG/ML (ref 30–100)
ALBUMIN SERPL-MCNC: 3.9 G/DL (ref 3.2–4.6)
ALBUMIN/GLOB SERPL: 1.4 {RATIO} (ref 0.4–1.6)
ALP SERPL-CCNC: 56 U/L (ref 50–136)
ALT SERPL-CCNC: 14 U/L (ref 12–65)
ANION GAP SERPL CALC-SCNC: 4 MMOL/L (ref 2–11)
AST SERPL-CCNC: 14 U/L (ref 15–37)
BASOPHILS # BLD: 0 K/UL (ref 0–0.2)
BASOPHILS NFR BLD: 1 % (ref 0–2)
BILIRUB SERPL-MCNC: 0.6 MG/DL (ref 0.2–1.1)
BUN SERPL-MCNC: 22 MG/DL (ref 8–23)
CALCIUM SERPL-MCNC: 9.4 MG/DL (ref 8.3–10.4)
CHLORIDE SERPL-SCNC: 108 MMOL/L (ref 101–110)
CO2 SERPL-SCNC: 28 MMOL/L (ref 21–32)
CREAT SERPL-MCNC: 1 MG/DL (ref 0.8–1.5)
DIFFERENTIAL METHOD BLD: ABNORMAL
EOSINOPHIL # BLD: 0.1 K/UL (ref 0–0.8)
EOSINOPHIL NFR BLD: 1 % (ref 0.5–7.8)
ERYTHROCYTE [DISTWIDTH] IN BLOOD BY AUTOMATED COUNT: 13.2 % (ref 11.9–14.6)
FERRITIN SERPL-MCNC: 59 NG/ML (ref 8–388)
FOLATE SERPL-MCNC: 11.3 NG/ML (ref 3.1–17.5)
GLOBULIN SER CALC-MCNC: 2.8 G/DL (ref 2.8–4.5)
GLUCOSE SERPL-MCNC: 94 MG/DL (ref 65–100)
HCT VFR BLD AUTO: 37.7 %
HGB BLD-MCNC: 12.4 G/DL (ref 13.6–17.2)
IMM GRANULOCYTES # BLD AUTO: 0 K/UL (ref 0–0.5)
IMM GRANULOCYTES NFR BLD AUTO: 0 % (ref 0–5)
IRON SATN MFR SERPL: 30 %
IRON SERPL-MCNC: 92 UG/DL (ref 35–150)
LYMPHOCYTES # BLD: 0.9 K/UL (ref 0.5–4.6)
LYMPHOCYTES NFR BLD: 12 % (ref 13–44)
MCH RBC QN AUTO: 29.7 PG (ref 26.1–32.9)
MCHC RBC AUTO-ENTMCNC: 32.9 G/DL (ref 31.4–35)
MCV RBC AUTO: 90.4 FL (ref 82–102)
MONOCYTES # BLD: 0.7 K/UL (ref 0.1–1.3)
MONOCYTES NFR BLD: 9 % (ref 4–12)
NEUTS SEG # BLD: 5.8 K/UL (ref 1.7–8.2)
NEUTS SEG NFR BLD: 77 % (ref 43–78)
NRBC # BLD: 0 K/UL (ref 0–0.2)
PLATELET # BLD AUTO: 138 K/UL (ref 150–450)
PMV BLD AUTO: 9.6 FL (ref 9.4–12.3)
POTASSIUM SERPL-SCNC: 4.6 MMOL/L (ref 3.5–5.1)
PROT SERPL-MCNC: 6.7 G/DL (ref 6.3–8.2)
RBC # BLD AUTO: 4.17 M/UL (ref 4.23–5.6)
SODIUM SERPL-SCNC: 140 MMOL/L (ref 133–143)
TIBC SERPL-MCNC: 302 UG/DL (ref 250–450)
VIT B12 SERPL-MCNC: 1736 PG/ML (ref 193–986)
WBC # BLD AUTO: 7.5 K/UL (ref 4.3–11.1)

## 2022-11-30 PROCEDURE — G8484 FLU IMMUNIZE NO ADMIN: HCPCS | Performed by: INTERNAL MEDICINE

## 2022-11-30 PROCEDURE — G8427 DOCREV CUR MEDS BY ELIG CLIN: HCPCS | Performed by: INTERNAL MEDICINE

## 2022-11-30 PROCEDURE — 99204 OFFICE O/P NEW MOD 45 MIN: CPT | Performed by: INTERNAL MEDICINE

## 2022-11-30 PROCEDURE — 1036F TOBACCO NON-USER: CPT | Performed by: INTERNAL MEDICINE

## 2022-11-30 PROCEDURE — 82746 ASSAY OF FOLIC ACID SERUM: CPT

## 2022-11-30 PROCEDURE — 3017F COLORECTAL CA SCREEN DOC REV: CPT | Performed by: INTERNAL MEDICINE

## 2022-11-30 PROCEDURE — 82306 VITAMIN D 25 HYDROXY: CPT

## 2022-11-30 PROCEDURE — 36415 COLL VENOUS BLD VENIPUNCTURE: CPT

## 2022-11-30 PROCEDURE — 82607 VITAMIN B-12: CPT

## 2022-11-30 PROCEDURE — 85025 COMPLETE CBC W/AUTO DIFF WBC: CPT

## 2022-11-30 PROCEDURE — 1123F ACP DISCUSS/DSCN MKR DOCD: CPT | Performed by: INTERNAL MEDICINE

## 2022-11-30 PROCEDURE — 80053 COMPREHEN METABOLIC PANEL: CPT

## 2022-11-30 PROCEDURE — 83540 ASSAY OF IRON: CPT

## 2022-11-30 PROCEDURE — G8420 CALC BMI NORM PARAMETERS: HCPCS | Performed by: INTERNAL MEDICINE

## 2022-11-30 PROCEDURE — 82728 ASSAY OF FERRITIN: CPT

## 2022-11-30 RX ORDER — EPINEPHRINE 0.3 MG/.3ML
0.3 INJECTION SUBCUTANEOUS PRN
COMMUNITY
Start: 2020-07-22

## 2022-11-30 ASSESSMENT — PATIENT HEALTH QUESTIONNAIRE - PHQ9
SUM OF ALL RESPONSES TO PHQ9 QUESTIONS 1 & 2: 0
SUM OF ALL RESPONSES TO PHQ QUESTIONS 1-9: 0
1. LITTLE INTEREST OR PLEASURE IN DOING THINGS: 0
2. FEELING DOWN, DEPRESSED OR HOPELESS: 0

## 2022-11-30 ASSESSMENT — ENCOUNTER SYMPTOMS
ABDOMINAL DISTENTION: 0
NAUSEA: 0
VOMITING: 0
ABDOMINAL PAIN: 0
BLOOD IN STOOL: 0
EYE PROBLEMS: 0
BACK PAIN: 0
DIARRHEA: 0
CONSTIPATION: 0
COUGH: 0
SCLERAL ICTERUS: 0
HEMOPTYSIS: 0
SHORTNESS OF BREATH: 0
SORE THROAT: 0

## 2022-11-30 NOTE — PATIENT INSTRUCTIONS
Patient Instructions from Today's Visit    Reason for Visit:  New patient - history of neuroendocrine tumor, lymphoma. Plan:  Ct scheduled for tomorrow. Labs today. Follow Up: In February with labs. Recent Lab Results:  N/a    Treatment Summary has been discussed and given to patient: n/a        -------------------------------------------------------------------------------------------------------------------  Please call our office at [phone] if you have any  of the following symptoms:   Fever of 100.5 or greater  Chills  Shortness of breath  Swelling or pain in one leg    After office hours an answering service is available and will contact a provider for emergencies or if you are experiencing any of the above symptoms. Patient did express an interest in My Chart. My Chart log in information explained on the after visit summary printout at the [facility] desk.     Nestor Mclaughlin RN

## 2022-12-01 ENCOUNTER — HOSPITAL ENCOUNTER (OUTPATIENT)
Dept: CT IMAGING | Age: 77
Discharge: HOME | End: 2022-12-01
Payer: MEDICARE

## 2022-12-01 DIAGNOSIS — R59.1 LYMPHADENOPATHY: ICD-10-CM

## 2022-12-01 DIAGNOSIS — D3A.8 NEUROENDOCRINE TUMOR: ICD-10-CM

## 2022-12-01 LAB — PATH REV BLD -IMP: NORMAL

## 2022-12-01 PROCEDURE — 71260 CT THORAX DX C+: CPT

## 2022-12-01 PROCEDURE — 6360000004 HC RX CONTRAST MEDICATION: Performed by: INTERNAL MEDICINE

## 2022-12-01 RX ORDER — SODIUM CHLORIDE 0.9 % (FLUSH) 0.9 %
10 SYRINGE (ML) INJECTION
Status: DISCONTINUED | OUTPATIENT
Start: 2022-12-01 | End: 2022-12-05 | Stop reason: HOSPADM

## 2022-12-01 RX ORDER — 0.9 % SODIUM CHLORIDE 0.9 %
100 INTRAVENOUS SOLUTION INTRAVENOUS
Status: DISCONTINUED | OUTPATIENT
Start: 2022-12-01 | End: 2022-12-05 | Stop reason: HOSPADM

## 2022-12-01 RX ADMIN — IOPAMIDOL 100 ML: 755 INJECTION, SOLUTION INTRAVENOUS at 09:24

## 2022-12-01 RX ADMIN — DIATRIZOATE MEGLUMINE AND DIATRIZOATE SODIUM 15 ML: 660; 100 LIQUID ORAL; RECTAL at 09:24

## 2023-02-01 DIAGNOSIS — C82.98 FOLLICULAR LYMPHOMA OF LYMPH NODES OF MULTIPLE REGIONS, UNSPECIFIED FOLLICULAR LYMPHOMA TYPE (HCC): Primary | ICD-10-CM

## 2023-02-01 DIAGNOSIS — D64.9 ANEMIA, UNSPECIFIED TYPE: ICD-10-CM

## 2023-02-08 ASSESSMENT — ENCOUNTER SYMPTOMS
NAUSEA: 0
HEMOPTYSIS: 0
COUGH: 0
CONSTIPATION: 0
ABDOMINAL PAIN: 0
SCLERAL ICTERUS: 0
SORE THROAT: 0
ABDOMINAL DISTENTION: 0
BACK PAIN: 0
VOMITING: 0
DIARRHEA: 0
SHORTNESS OF BREATH: 0
EYE PROBLEMS: 0
BLOOD IN STOOL: 0

## 2023-02-08 NOTE — PROGRESS NOTES
[facility] Hematology and Oncology: Established patient - follow up     Chief Complaint   Patient presents with    Follow-up     Reason for Referral: Lung mass  --> actually here for follicular lymphoma monitoring locally   Referring Provider:  Fernando Pro MD  Primary Care Provider: Branden Luevano MD  Family History of Cancer/Hematologic Disorders: Father and brother with prostate cancer  Presenting Symptoms: originally none-incidental findings    History of Present Illness:  Mr. Tavares Gray is a 78 y.o. male who presents today for follow up regarding lung mass, but actually here to establish local care for [facility] surveillance. The past medical history is significant for heart abnormality, HLD, and neuroendocrine carcinoma of lung. Sxhx: cataract removal, left lobectomy, hand and tonsillectomy. Mr. Tavares Gray has a history of stage IIB/V7uY0T0 neuroendocrine lung cancer from 2018. This was originally discovered incidentally by a calcium scoring CT scan of chest that reported a 3 cm lung mass in the left lower lobe. He had a bronchoscopy by Dr Julio Escamilla in 6/2018 in which pathology reported as a neuroendocrine tumor. He saw Dr Christina Barkley for surgery consult and the recommendation was for left lower lobectomy. He went to the [facility] for second opinion. On 8/1/2018 he underwent a robotic assisted thoracoscopy left lower lobectomy with mediastinal lymphadenectomy by Dr Ousmane Mendiola at the [facility]. Final pathology reported a typical carcinoid with a single intrapulmonary lymph node positive for metastatic tumor. No systemic chemotherapy was recommended and he was to maintain on observation with imaging. November 2018 PET scan showed possible lytic lesion on rib and avid uptake of the prostate. He underwent a CT scan to evaluate the rib lesion which reported no concern. He then underwent a biopsy of his prostate on 12/5/2018 by Dr Delgado Kevin at [facility], which was benign.   He remained on imaging surveillance with pulm for lung and urology for prostate. 10/2019 CT scan of chest abdomen and pelvis reported increase in size of multiple mesenteric lymph nodes with a general haziness to the small bowel mesentery. He had a NETSPOT PET scan on 11/5/2019 that showed mild avid lymph node and increase in size of LN - bx was recommended. On 12/20/2019 he underwent a biopsy of mesenteric lymph node by at [facility] - 2 of the lymph nodes were benign and 3rd one was c/w Follicular lymphoma, grade 1-2. The IHC staining of that positive lymph node reported nodules of lymphocytes were CD20-positive B-cells that showed kappa light chain restriction and coexpressed CD10, BCL6, and aberrant expression of BCL2. His 4/2020 CT scan of chest/abdomen/pelvis reported disease progression by solitary enlarging small bowel mesenteric lymph node. He saw Dr Divya Lara in 5/ 2020 who recommended a surgical excision of lymph node for diagnostic purposes. He decided to go to [facility] for an opinion. In 6/ 2020 he met with hematologist Dr Phylicia Rubio. A bone marrow biopsy was not recommended due to no cytopenia. His 6/2020 LD and [id number] were normal.  6/2020 hepatitis and HIV were negative. He chose observation. In 9/2020 he had follow up with hematologist and plan was for yearly scans and every 6 months blood work. 4/2021 - saw ENT at [facility] and had a FNA of thyroid nodule. The cytology reported as benign. He was offered an excision however he chose monitoring. Plan to repeat ultrasound in 1 year and laryngoscopy in 6 months.      5/2022 - saw Dr Aldo Carrington at [facility] with otorhinolaryngology for opinion on his laryngeal mass/lesion. 5/17/22 CT neck soft tissue scan c/w fluid collection by hypopharynx and increased adenopathy with possible progression of lymphoma. Plan for observation with scans for hypopharynx.   In May 2022 he saw Dr Phylicia Rubio at [facility] with hematology for a CT scan of chest that showed increased lymph nodes and CT of abdomen and pelvis w/ increased retroperitoneal nodes with normal spleen. Per clinic note 5/17/22, labs were stable and no complaints of night sweats. Imaging was reviewed and although increase in size of lymph nodes most were within normal limits. Hematology plan was to continue labs and imaging in 1 year however he was to continue medical oncology follow up for his lung cancer. He met with Dr Violeta Bain with orthopedic surgery at [facility] for his shoulder/rotator cuff issue. He decided to forgo surgery at that time and try injections for the pain. He received his first injection that day and was to continue care with his local orthopedist.  Next appointment at [facility] for Hematology planned for May 2023. In October 2022, he presented to Dr Naima Escamilla for follow up on lung carcinoid. He reported close observation with [facility] clinic for his lymphoma. He requested a referral to [facility]. Referral placed for lung mass however per documentation he is seeking opinion on his Lymphoma. His 11/3/22 [facility] an elevated wBC of 12.6 with a decreased hematocrit of 37.8 and absolute lymphocytes of 0.8 otherwise WNL. former smoker of 0.25 pack per day of cigarettes that quit in 1968. He denies use of alcohol or drug substances  [employer] - no exposures; deployed to [facility] - where he met his wife     At time of consultation, we discussed the pathophysiology of follicular lymphoma and discussed staging, grading, as well as typical indications for treatment. We reviewed his complex medical/oncological hx. He was here with his wife. We discussed surveillance plan. Today, he is here for FU after CT. Ct showed progressive LAD. He reviewed this with Dr Veronica Tyler at [facility] (virtual visit). He communicated with Dr Tatianna Gamboa and plan will be to repeat imaging at [facility] in May. Will c/w surveillance. No B symptoms. Labs reviewed and stable anemia noted - checking nutritional studies.   We also reviewed typical indications for tx, he and wife LEONORA. They are in agreement with this plan. We talked about his genetics/travel and education. They are planning to go to [place] this summer. Chronological Events:    Pathology   6/7/2018 Surgical Pathology ([facility])  \"LLL NODULE BIOPSY\":  NEUROENDOCRINE TUMOR. SEE COMMENT. Comment      Morphologic and immunohistochemical features are consistent with   neuroendocrine differentiation. Immunohistochemical slides for Ki-67   (cell proliferation nuclear antigen) demonstrates only scattered cells   with nuclear staining. These features are suggestive of a low-grade   neuroendocrine tumor. Clinical correlation is recommended. [facility]-IMMUNOHISTOCHEMISTRY                Immunohistochemical Stain Panel:   Interpretation:  Immunohistochemical slides demonstrate features   consistent with neuroendocrine differentiation. Antibody/Test                                                  Marker For                         Result   Pancytokeratin (AE1/AE3)     Broad spectrum epithelial marker              Weakly positive   Synaptophysin               Neural and neuroendocrine tumors                    Strongly positive   CD56                    Neuroendocrine differentiation                                     Strongly positive   TTF-1                    Lung adenocarcinoma and thyroid carcinomas           Positive, nuclear pattern   Ki-67                    Cell proliferation, nuclear antigen                                 Scattered positive cells,nuclear pattern     CD45                    Leukocyte common antigen                                          Rare positive cells     8/1/2018 Surgical Pathology [facility])  FINAL DIAGNOSIS   A. Lymph node, left interlobar (station 11L), biopsy:   Fragments of lymph node, negative for tumor. B.  Lymph node, left interlobar (station 11L) set 2,   biopsy:  Fragments of lymph node, negative for tumor.    C.  Lymph node, left interlobar (station 11L) set 3,   biopsy:  Fragments of lymph node, negative for tumor. D. Lymph node, subaortic (station 5), biopsy:  Fragments of   lymph node, negative for tumor. E.  Lymph node, subcarinal (station 7), biopsy:  Fragments   of lymph node, negative for tumor. Rodriguez De La Torre, left lower lobe, lobectomy:  Typical carcinoid   tumor forming a circumscribed, solid, central mass (2.1 x   1.7 x 1.5 cm). The bronchial margin is negative for tumor   by 2.1 cm. A single (1 of 4) intrapulmonary peribronchial    lymph node is positive for metastatic carcinoid tumor. See synoptic report. SYNOPTIC REPORT   Procedure: Lobectomy. Specimen Laterality: Left. Tumor Site: Lower lobe. Total Tumor Size: 2.1 cm in greatest dimension. Additional dimensions:  1.7 x 1.5 cm. Tumor Focality: Single tumor. Histologic Type: Typical carcinoid tumor. Histologic Grade: Well differentiated. Visceral Pleural Invasion: Not identified. Lymphovascular Invasion: Present. Direct Invasion of Adjacent Structures: No adjacent   structures present. Margins: All margins are uninvolved by carcinoma. Margins examined:  Bronchial, parenchymal resection. Distance of invasive carcinoma from closest margin:   2.0 cm. Specify closest margin:  Stapled parenchymal resection   margin. Bronchial Margin:  Uninvolved by invasive carcinoma. Vascular Margin:  Not applicable. Parenchymal Margin:  Uninvolved by invasive carcinoma. Treatment Effect: No known presurgical therapy. Regional Lymph Nodes        Number of Lymph Nodes Involved: 1        Number of Lymph Nodes Examined: 9   Pathologic Staging (AJCC, 8th edition)        TNM Descriptors: Not applicable. Primary Tumor: pT1c:        Regional lymph nodes: pN1:        Distant Metastasis: Not applicable. 12/4/2018 Surgical Pathology ([place])    FINAL DIAGNOSIS   A. Prostate, right side, needle core biopsy:  Benign   prostatic tissue.    B. Prostate, left side, needle core biopsy:  Benign   prostatic tissue. C.  Prostate, left transition zone, needle core biopsy:   Benign prostatic tissue. D.  Prostate, right transition zone, needle core biopsy:   Benign prostatic tissue. 12/20/2019 Surgical Pathology ([place])    REVISION DESCRIPTION   Changing the diagnosis on part C (lymph node, mesenteric   No.   3, biopsy) and adding description. Underlining in the PDF report indicates revision. FINAL DIAGNOSIS   A. Lymph node, mesenteric, biopsy:  A single (1) lymph node   is negative for tumor. B. Lymph node, mesenteric No. 2, biopsy:  A single (1)   lymph node is negative for tumor. C. Lymph node, mesenteric No. 3, biopsy: Follicular   lymphoma, grade 1-2. Sections show biopsy of lymph node composed of nodular   aggregates of small lymphocytes with oval nuclei and   condensed chromatin. No significant large cell population   is present (<5 centroblasts per high-power field). Immunoperoxidase studies were performed at [facility] in   [place], [place], on paraffin sections of the mesenteric No. 3   lymph node (block C1) using antibodies directed against the   following antigens:  CD3, CD5, CD10, CD20, CD23, CD43,   BCL2,   BCL6, cyclin D1, IgD, and kappa and lambda immunoglobulin   light chains. Stains demonstrate that the nodules of lymphocytes are   CD20-positive B-cells that show kappa light chain   restriction and coexpress CD10, BCL6, and show aberrant   expression of BCL2. These grow in association with focal   [id number] follicular dendritic meshworks. The   remainder   of the stains tested are negative within the nodules. This final pathology report is based on the   gross/macroscopic examination, the frozen section   histologic   evaluation of the specimen(s), and review of the   Hematoxylin   and Eosin (H&E) permanent sections. Any revised   information   from the preliminary report is underlined.    4/27/2021 Cytology ([facility]) [place] [address], [place], [place]   Patient:   Paulo Alva     :       1945     MRN:          FIN:       [id number]     Provider:  Curly Zafar                                             Aspiration Cytology Report       Accession #:                                       [id number]   Collection Date/Time:                              2021 13:30 EDT                                 Aspiration Cytology Report - 2021 08:51 EDT   Clinical Information:   Right thyroid nodule. DIAGNOSIS:     THYROID, RIGHT NODULE, FINE NEEDLE ASPIRATION:      . BETHESDA CATEGORY 2 (TWO). . CONSISTENT WITH A BENIGN THYROID NODULE            Imaging   2018  PET/CT ([place])  IMPRESSION:     1. High suspicion for prostate carcinoma with neuroendocrine features   particularly given reported strong family history of prostate carcinoma. Recommend prostate MRI for further evaluation. Krenning score:  3   2. Small foci of right rib activity indeterminate for metastases, suspect more   likely related to the aforementioned prostate findings as opposed to the   patient's history of bronchial carcinoid. 2018 CT Chest [facility])  IMPRESSION:   1. Interval PO changes of a left lower lobectomy. 2. No rib abnormalities to correspond to the areas of uptake on the PET/CT. However, this is likely due to differences in sensitivity between the   techniques     10/25/2019 CT Chest/abdomen/pelvis ([facility])  IMPRESSION:   1. Increase in size and number of multiple mesenteric lymph nodes with a general haziness to the small bowel mesentery. The findings are nonspecific. Metastatic disease is not excluded. Could consider further evaluation with PET/CT to evaluate the metabolic activity of these lymph nodes. 2. Postsurgical changes in keeping with prior left upper lobectomy.  No findings to suspect disease recurrence of the chest.   3. Cholelithiasis 4. Right hepatic lobe cyst.   5. Enlarged prostate gland. 6. Sigmoid colon diverticulosis. 11/5/2019 PET/CT NETSPOT [facility])  Addendum: Of all of the patient's prior studies loaded to the PACS only   the CT abdomen of 4/15/2019 completely image the mesenteric lymph nodes in the abdomen. The enlarged node that is mildly tracer avid in the small bowel mesentery   on this study measures larger than it did. On the prior study this   measured up to 9 mm compared to 13 mm now. Probably tissue sampling/resection of this would be needed for   confirmation of its etiology  IMPRESSION:   1. No evidence for recurrent disease or disease progression in the chest.   One RIGHT rib focus: Posteriorly at the sixth rib again shows mild Dotatate tracer activity-indeterminate. In the abdomen the \"robert mesentery \" appearance at the small bowel mesentery appears to persist.  One lymph node is now enlarged measuring 13 mm and shows mild but clearly evident uptake of tracer. This is indeterminant, a metastasis cannot be   excluded. Recommendation-if possible obtained the prior imaging studies for for direct comparison at this site. We would be happy to add an addendum to this report if this is possible. 2.  Again there is prostatomegaly and heterogeneous increased tracer uptake in the prostate gland. A specific size for any lymph node in this region. No other convincing site of abnormal tracer activity seen     4/27/2020 CT Chest Abdomen Pelvis ([facility])  IMPRESSION:   1. Disease progression as evidenced by solitary enlarging small bowel mesenteric lymph node      5/17/2022 CT Neck Soft Tissue with IV Contrast ([place])  Impression: 1. Apparently submucosal fluid collection along the posterior wall of the hypopharynx has increased in size since 07/09/2020. This is nonspecific in appearance but may represent a lymphocele or lymphatic malformation. A tiny cyst along the left aryepiglottic fold stable.  2. Increased left low cervical, supraclavicular, axillary adenopathy suggesting lymphoma progression. 2022 CT Chest with IV Contrast ([place])  Impression: 1. Mildly increased size of retrocrural and left axillary/subpectoral lymph nodes. 2. This examination was performed in conjunction with a CT of the neck and abdomen, which will be reported separately. 2022 CT Abdomen Pelvis with IV Contrast ([place])  Impression: 1. The spleen is not enlarged. 2. Increased size of the retroperitoneal lymph nodes compared to 2021. 3. Decreased size of a bulky dominant mesenteric lymph node however the remainder of the enlarged mesenteric lymph nodes appear stable in size compared to 2021. Labs      2020           11/3/22       [facility], Dr Fabi Rahman, for pyriform sinus mass and thyroid nodule  He saw Dr Carmella Tian with urology in 10/2022    11/30/22 heme/onc consultation re follicular lymphoma   56 FU after CT; s/p tele visit with Dr Ashlee Rudd - no pod in terms of carcinoid; LAD worsening but no change in labs and no B symptoms - c/w surveillance. Family History   Problem Relation Age of Onset    Heart Disease Father     Heart Disease Brother     Cancer Brother         prostate    Cancer Father         prostate      Social History     Socioeconomic History    Marital status:      Spouse name: None    Number of children: None    Years of education: None    Highest education level: None   Tobacco Use    Smoking status: Former     Packs/day: 0.25     Types: Cigarettes     Quit date: 1968     Years since quittin.1    Smokeless tobacco: Never   Substance and Sexual Activity    Alcohol use: No    Drug use: No        Review of Systems   Constitutional:  Negative for appetite change, diaphoresis, fatigue, fever and unexpected weight change. HENT:   Negative for sore throat. Eyes:  Negative for eye problems and icterus.    Respiratory:  Negative for cough, hemoptysis and shortness of breath. Cardiovascular:  Negative for chest pain, leg swelling and palpitations. Gastrointestinal:  Negative for abdominal distention, abdominal pain, blood in stool, constipation, diarrhea, nausea and vomiting. Endocrine: Negative for hot flashes. Genitourinary:  Negative for dysuria. Musculoskeletal:  Positive for arthralgias. Negative for back pain and gait problem. Skin:  Negative for itching, rash and wound. Neurological:  Negative for dizziness, extremity weakness, gait problem, headaches, light-headedness, numbness, seizures and speech difficulty. Hematological:  Negative for adenopathy. Does not bruise/bleed easily. Psychiatric/Behavioral:  Positive for sleep disturbance. Negative for decreased concentration and depression. The patient is not nervous/anxious.        Allergies   Allergen Reactions    Oxycodone-Acetaminophen Itching    Sulfa Antibiotics Itching    Sulfasalazine Other (See Comments)    Tyloxapol Other (See Comments)     Past Medical History:   Diagnosis Date    Heart abnormality     Hypercholesterolemia     Neuroendocrine carcinoma of lung (HCC)      Past Surgical History:   Procedure Laterality Date    CATARACT REMOVAL      LOBECTOMY Left     ORTHOPEDIC SURGERY      right hand surgery    TONSILLECTOMY       Current Outpatient Medications   Medication Sig Dispense Refill    EPINEPHrine (EPIPEN) 0.3 MG/0.3ML SOAJ injection Inject 0.3 mg into the muscle as needed      traMADol-acetaminophen (ULTRACET) 37.5-325 MG per tablet       dutasteride (AVODART) 0.5 MG capsule Take 1 capsule by mouth daily 90 capsule 3    alfuzosin (UROXATRAL) 10 MG extended release tablet Take 1 tablet by mouth daily 90 tablet 3    tadalafil (CIALIS) 5 MG tablet Take 1 tablet by mouth daily 90 tablet 3    Ubiquinol 100 MG CAPS take 1 capsule daily      Omega-3 Fatty Acids (FISH OIL) 1000 MG capsule Take 1 tablet by mouth daily      cyanocobalamin 1000 MCG tablet Take 3,000 mcg by mouth daily      CPAP Machine MISC       zolpidem (AMBIEN CR) 6.25 MG extended release tablet       aspirin 81 MG EC tablet Take 81 mg by mouth daily      Cholecalciferol 50 MCG (2000 UT) TABS Take 1 tablet by mouth daily      fluticasone (FLONASE) 50 MCG/ACT nasal spray 2 sprays by Nasal route daily as needed      pseudoephedrine (SUDAFED) 30 MG tablet Take by mouth every 4 hours as needed      simvastatin (ZOCOR) 40 MG tablet Take by mouth       No current facility-administered medications for this visit. No flowsheet data found. OBJECTIVE:  /63   Pulse 78   Temp 98.6 °F (37 °C) (Oral)   Resp 16   Ht 5' 5.25\" (1.657 m)   Wt 147 lb 6.4 oz (66.9 kg)   SpO2 99%   BMI 24.34 kg/m²       ECOG PERFORMANCE STATUS - 0-Fully active, able to carry on all pre-disease performance without restriction. Pain - 2/10. None/Minimal pain - not affecting QOL  reported some pains in his legs lately but not affecting his QOL     Fatigue - No flowsheet data found. Distress - No flowsheet data found. Physical Exam  Vitals reviewed. Exam conducted with a chaperone present. Constitutional:       General: He is not in acute distress. Appearance: Normal appearance. He is normal weight. He is not ill-appearing. HENT:      Head: Normocephalic and atraumatic. Nose: Nose normal. No congestion. Mouth/Throat:      Mouth: Mucous membranes are moist.   Eyes:      General: No scleral icterus. Extraocular Movements: Extraocular movements intact. Conjunctiva/sclera: Conjunctivae normal.      Pupils: Pupils are equal, round, and reactive to light. Cardiovascular:      Rate and Rhythm: Normal rate and regular rhythm. Heart sounds: No murmur heard. Pulmonary:      Effort: Pulmonary effort is normal. No respiratory distress. Breath sounds: Normal breath sounds. No wheezing, rhonchi or rales. Abdominal:      General: Abdomen is flat. There is no distension. Palpations: Abdomen is soft.  There is no mass.      Tenderness: There is no abdominal tenderness. Musculoskeletal:         General: Normal range of motion. Cervical back: Normal range of motion. No rigidity. Right lower leg: No edema. Left lower leg: No edema. Lymphadenopathy:      Head:      Right side of head: No submental or submandibular adenopathy. Left side of head: No submental or submandibular adenopathy. Cervical: No cervical adenopathy. Upper Body:      Right upper body: No supraclavicular or axillary adenopathy. Left upper body: No supraclavicular or axillary adenopathy. Skin:     General: Skin is warm and dry. Coloration: Skin is not jaundiced or pale. Findings: No bruising or rash. Neurological:      General: No focal deficit present. Mental Status: He is alert and oriented to person, place, and time. Motor: No weakness. Coordination: Coordination normal.      Gait: Gait normal.   Psychiatric:         Behavior: Behavior normal.         Thought Content:  Thought content normal.        Labs:  Recent Results (from the past 168 hour(s))   CBC with Auto Differential    Collection Time: 02/09/23 12:27 PM   Result Value Ref Range    WBC 6.5 4.3 - 11.1 K/uL    RBC 4.21 (L) 4.23 - 5.6 M/uL    Hemoglobin 12.6 (L) 13.6 - 17.2 g/dL    Hematocrit 38.1 (L) 41.1 - 50.3 %    MCV 90.5 82.0 - 102.0 FL    MCH 29.9 26.1 - 32.9 PG    MCHC 33.1 31.4 - 35.0 g/dL    RDW 12.8 11.9 - 14.6 %    Platelets 919 177 - 398 K/uL    MPV 10.0 9.4 - 12.3 FL    nRBC 0.00 0.0 - 0.2 K/uL    Seg Neutrophils 72 43 - 78 %    Lymphocytes 16 13 - 44 %    Monocytes 8 4.0 - 12.0 %    Eosinophils % 2 0.5 - 7.8 %    Basophils 1 0.0 - 2.0 %    Immature Granulocytes 1 0.0 - 5.0 %    Segs Absolute 4.8 1.7 - 8.2 K/UL    Absolute Lymph # 1.1 0.5 - 4.6 K/UL    Absolute Mono # 0.5 0.1 - 1.3 K/UL    Absolute Eos # 0.1 0.0 - 0.8 K/UL    Basophils Absolute 0.0 0.0 - 0.2 K/UL    Absolute Immature Granulocyte 0.0 0.0 - 0.5 K/UL Differential Type AUTOMATED     Comprehensive Metabolic Panel    Collection Time: 02/09/23 12:27 PM   Result Value Ref Range    Sodium 141 133 - 143 mmol/L    Potassium 4.8 3.5 - 5.1 mmol/L    Chloride 109 101 - 110 mmol/L    CO2 29 21 - 32 mmol/L    Anion Gap 3 2 - 11 mmol/L    Glucose 116 (H) 65 - 100 mg/dL    BUN 22 8 - 23 MG/DL    Creatinine 1.00 0.8 - 1.5 MG/DL    Est, Glom Filt Rate >60 >60 ml/min/1.73m2    Calcium 9.8 8.3 - 10.4 MG/DL    Total Bilirubin 0.6 0.2 - 1.1 MG/DL    ALT 14 12 - 65 U/L    AST 14 (L) 15 - 37 U/L    Alk Phosphatase 61 50 - 136 U/L    Total Protein 7.1 6.3 - 8.2 g/dL    Albumin 3.8 3.2 - 4.6 g/dL    Globulin 3.3 2.8 - 4.5 g/dL    Albumin/Globulin Ratio 1.2 0.4 - 1.6     Lactate Dehydrogenase    Collection Time: 02/09/23 12:27 PM   Result Value Ref Range     110 - 210 U/L   Reticulocytes    Collection Time: 02/09/23 12:27 PM   Result Value Ref Range    Reticulocyte Count,Automated 1.3 0.3 - 2.0 %    Absolute Retic # 0.0543 0.026 - 0.095 M/ul    Immature Retic Fraction 8.6 2.3 - 13.4 %    Retic Hemoglobin conc. 36 (H) 29 - 35 pg   Vitamin B12    Collection Time: 02/09/23 12:27 PM   Result Value Ref Range    Vitamin B-12 1492 (H) 193 - 986 pg/mL   Ferritin    Collection Time: 02/09/23 12:27 PM   Result Value Ref Range    Ferritin 68 8 - 388 NG/ML   Transferrin Saturation    Collection Time: 02/09/23 12:27 PM   Result Value Ref Range    Iron 109 35 - 150 ug/dL    TIBC 313 250 - 450 ug/dL    TRANSFERRIN SATURATION 35 >20 %       Imaging: reviewed     PATHOLOGY:   per above from OSH     ASSESSMENT:     Diagnosis Orders   1. 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 Anemia, unspecified type            Mr. Sy Lopez is here for evaluation of foicular lymphoma, to establish local care.       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  Ct showed progressive LAD. He reviewed this with Dr Crystal Verdugo at [facility] (virtual visit). He communicated with Dr Samuel Culver and plan will be to repeat imaging at [facility] in May. Will c/w surveillance. No B symptoms.    - Labs reviewed and stable anemia noted - checking nutritional studies. We also reviewed typical indications for tx, he and wife VU. They are in agreement with this plan. - discussed dietary modification     2. Neuroendocrine/carcinoid - s/p resection at [facility]   - path per above   - currently on surveillance at [facility] - most recently saw Dr Crystal Verdugo     3. Laryngeal/hypolaryngeal lesion - path benign   - sees Dr Army Chambers at [facility] - will be seen again in May 2023 at which time, he'll see Dr Bettina Mallory to determine if sx is needed/indicated. - plan for sleep study 6 mo prior to sx per records. 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, unspecified follicular lymphoma type ([facility] [address]): established, worsening     Amount and/or Complexity of Data Reviewed  Clinical lab tests: ordered and reviewed  Tests in the radiology section of CPT®: ordered and reviewed  Obtain history from someone other than the patient: yes  Review and summarize past medical records: yes  Independent visualization of images, tracings, or specimens: yes    Risk of Complications, Morbidity, and/or Mortality  Presenting problems: moderate  Diagnostic procedures: moderate  Management options: moderate        Lab studies and imaging studies were personally reviewed. Pertinent old records were reviewed. Historical:   -During today's consultation, we discussed the pathophysiology of hematopoiesis, as well as, follicular lymphoma.   We discussed staging, grade as well as risk. We also reviewed indications for treatment. He is a patient of Dr. Marino Bentley at [facility] in [place]. No current B symptoms. - planned CT is tomorrow - anemia - drop in Hb to 12.6 noted on Nov OSH labs - will p/w nutritional labs today and smear; plts normal, wbc normal - planning to establish surveillance sched: sees PCP in Nov, will FU with me in Feb, then Gudino in May/JUne, then with me in [address], and back to PCP in Nov.      All questions were asked and answered to the best of my ability. The patient verbalized understanding and agrees with the plan above.               Kendra Knowles, [address] [place] [address] Hematology and Oncology  [address] [place] [address]  Office : [phone]  Fax : [phone]

## 2023-02-09 ENCOUNTER — OFFICE VISIT (OUTPATIENT)
Dept: ONCOLOGY | Age: 78
End: 2023-02-09
Payer: MEDICARE

## 2023-02-09 ENCOUNTER — HOSPITAL ENCOUNTER (OUTPATIENT)
Dept: LAB | Age: 78
Discharge: HOME | End: 2023-02-09
Payer: MEDICARE

## 2023-02-09 VITALS
TEMPERATURE: 98.6 F | WEIGHT: 147.4 LBS | BODY MASS INDEX: 24.56 KG/M2 | HEART RATE: 78 BPM | RESPIRATION RATE: 16 BRPM | HEIGHT: 65 IN | OXYGEN SATURATION: 99 % | SYSTOLIC BLOOD PRESSURE: 129 MMHG | DIASTOLIC BLOOD PRESSURE: 63 MMHG

## 2023-02-09 DIAGNOSIS — C82.98 FOLLICULAR LYMPHOMA OF LYMPH NODES OF MULTIPLE REGIONS, UNSPECIFIED FOLLICULAR LYMPHOMA TYPE (HCC): Primary | ICD-10-CM

## 2023-02-09 DIAGNOSIS — C82.98 FOLLICULAR LYMPHOMA OF LYMPH NODES OF MULTIPLE REGIONS, UNSPECIFIED FOLLICULAR LYMPHOMA TYPE (HCC): ICD-10-CM

## 2023-02-09 DIAGNOSIS — D64.9 ANEMIA, UNSPECIFIED TYPE: ICD-10-CM

## 2023-02-09 DIAGNOSIS — Z90.2 S/P LOBECTOMY OF LUNG: ICD-10-CM

## 2023-02-09 DIAGNOSIS — R74.8 INCREASED VITAMIN B12 LEVEL: ICD-10-CM

## 2023-02-09 DIAGNOSIS — E55.9 VITAMIN D DEFICIENCY: ICD-10-CM

## 2023-02-09 LAB
25(OH)D3 SERPL-MCNC: 32 NG/ML (ref 30–100)
ALBUMIN SERPL-MCNC: 3.8 G/DL (ref 3.2–4.6)
ALBUMIN/GLOB SERPL: 1.2 (ref 0.4–1.6)
ALP SERPL-CCNC: 61 U/L (ref 50–136)
ALT SERPL-CCNC: 14 U/L (ref 12–65)
ANION GAP SERPL CALC-SCNC: 3 MMOL/L (ref 2–11)
AST SERPL-CCNC: 14 U/L (ref 15–37)
BASOPHILS # BLD: 0 K/UL (ref 0–0.2)
BASOPHILS NFR BLD: 1 % (ref 0–2)
BILIRUB SERPL-MCNC: 0.6 MG/DL (ref 0.2–1.1)
BUN SERPL-MCNC: 22 MG/DL (ref 8–23)
CALCIUM SERPL-MCNC: 9.8 MG/DL (ref 8.3–10.4)
CHLORIDE SERPL-SCNC: 109 MMOL/L (ref 101–110)
CO2 SERPL-SCNC: 29 MMOL/L (ref 21–32)
CREAT SERPL-MCNC: 1 MG/DL (ref 0.8–1.5)
DIFFERENTIAL METHOD BLD: ABNORMAL
EOSINOPHIL # BLD: 0.1 K/UL (ref 0–0.8)
EOSINOPHIL NFR BLD: 2 % (ref 0.5–7.8)
ERYTHROCYTE [DISTWIDTH] IN BLOOD BY AUTOMATED COUNT: 12.8 % (ref 11.9–14.6)
FERRITIN SERPL-MCNC: 68 NG/ML (ref 8–388)
GLOBULIN SER CALC-MCNC: 3.3 G/DL (ref 2.8–4.5)
GLUCOSE SERPL-MCNC: 116 MG/DL (ref 65–100)
HCT VFR BLD AUTO: 38.1 % (ref 41.1–50.3)
HGB BLD-MCNC: 12.6 G/DL (ref 13.6–17.2)
HGB RETIC QN AUTO: 36 PG (ref 29–35)
IMM GRANULOCYTES # BLD AUTO: 0 K/UL (ref 0–0.5)
IMM GRANULOCYTES NFR BLD AUTO: 1 % (ref 0–5)
IMM RETICS NFR: 8.6 % (ref 2.3–13.4)
IRON SATN MFR SERPL: 35 %
IRON SERPL-MCNC: 109 UG/DL (ref 35–150)
LDH SERPL L TO P-CCNC: 173 U/L (ref 110–210)
LYMPHOCYTES # BLD: 1.1 K/UL (ref 0.5–4.6)
LYMPHOCYTES NFR BLD: 16 % (ref 13–44)
MCH RBC QN AUTO: 29.9 PG (ref 26.1–32.9)
MCHC RBC AUTO-ENTMCNC: 33.1 G/DL (ref 31.4–35)
MCV RBC AUTO: 90.5 FL (ref 82–102)
MONOCYTES # BLD: 0.5 K/UL (ref 0.1–1.3)
MONOCYTES NFR BLD: 8 % (ref 4–12)
NEUTS SEG # BLD: 4.8 K/UL (ref 1.7–8.2)
NEUTS SEG NFR BLD: 72 % (ref 43–78)
NRBC # BLD: 0 K/UL (ref 0–0.2)
PLATELET # BLD AUTO: 151 K/UL (ref 150–450)
PMV BLD AUTO: 10 FL (ref 9.4–12.3)
POTASSIUM SERPL-SCNC: 4.8 MMOL/L (ref 3.5–5.1)
PROT SERPL-MCNC: 7.1 G/DL (ref 6.3–8.2)
RBC # BLD AUTO: 4.21 M/UL (ref 4.23–5.6)
RETICS # AUTO: 0.05 M/UL (ref 0.03–0.1)
RETICS/RBC NFR AUTO: 1.3 % (ref 0.3–2)
SODIUM SERPL-SCNC: 141 MMOL/L (ref 133–143)
TIBC SERPL-MCNC: 313 UG/DL (ref 250–450)
VIT B12 SERPL-MCNC: 1492 PG/ML (ref 193–986)
WBC # BLD AUTO: 6.5 K/UL (ref 4.3–11.1)

## 2023-02-09 PROCEDURE — 3017F COLORECTAL CA SCREEN DOC REV: CPT | Performed by: INTERNAL MEDICINE

## 2023-02-09 PROCEDURE — G8427 DOCREV CUR MEDS BY ELIG CLIN: HCPCS | Performed by: INTERNAL MEDICINE

## 2023-02-09 PROCEDURE — 85046 RETICYTE/HGB CONCENTRATE: CPT

## 2023-02-09 PROCEDURE — 99214 OFFICE O/P EST MOD 30 MIN: CPT | Performed by: INTERNAL MEDICINE

## 2023-02-09 PROCEDURE — 83615 LACTATE (LD) (LDH) ENZYME: CPT

## 2023-02-09 PROCEDURE — 85025 COMPLETE CBC W/AUTO DIFF WBC: CPT

## 2023-02-09 PROCEDURE — 83540 ASSAY OF IRON: CPT

## 2023-02-09 PROCEDURE — 36415 COLL VENOUS BLD VENIPUNCTURE: CPT

## 2023-02-09 PROCEDURE — 1036F TOBACCO NON-USER: CPT | Performed by: INTERNAL MEDICINE

## 2023-02-09 PROCEDURE — 82306 VITAMIN D 25 HYDROXY: CPT

## 2023-02-09 PROCEDURE — 82728 ASSAY OF FERRITIN: CPT

## 2023-02-09 PROCEDURE — 80053 COMPREHEN METABOLIC PANEL: CPT

## 2023-02-09 PROCEDURE — 1123F ACP DISCUSS/DSCN MKR DOCD: CPT | Performed by: INTERNAL MEDICINE

## 2023-02-09 PROCEDURE — 82607 VITAMIN B-12: CPT

## 2023-02-09 PROCEDURE — G8420 CALC BMI NORM PARAMETERS: HCPCS | Performed by: INTERNAL MEDICINE

## 2023-02-09 PROCEDURE — G8484 FLU IMMUNIZE NO ADMIN: HCPCS | Performed by: INTERNAL MEDICINE

## 2023-02-09 ASSESSMENT — PATIENT HEALTH QUESTIONNAIRE - PHQ9
SUM OF ALL RESPONSES TO PHQ QUESTIONS 1-9: 0
1. LITTLE INTEREST OR PLEASURE IN DOING THINGS: 0
SUM OF ALL RESPONSES TO PHQ QUESTIONS 1-9: 0
2. FEELING DOWN, DEPRESSED OR HOPELESS: 0
SUM OF ALL RESPONSES TO PHQ9 QUESTIONS 1 & 2: 0

## 2023-02-09 NOTE — PATIENT INSTRUCTIONS
Patient Instructions from Today's Visit    Reason for Visit:  Follow up      Plan:  If CT continues to show progression, consider a PET scan. Recommend adding green leafy vegetables to your diet to increase hemoglobin.       Follow Up:  1 year    Recent Lab Results:  Hospital Outpatient Visit on 02/09/2023   Component Date Value Ref Range Status    WBC 02/09/2023 6.5  4.3 - 11.1 K/uL Final    RBC 02/09/2023 4.21 (A)  4.23 - 5.6 M/uL Final    Hemoglobin 02/09/2023 12.6 (A)  13.6 - 17.2 g/dL Final    Hematocrit 02/09/2023 38.1 (A)  41.1 - 50.3 % Final    MCV 02/09/2023 90.5  82.0 - 102.0 FL Final    MCH 02/09/2023 29.9  26.1 - 32.9 PG Final    MCHC 02/09/2023 33.1  31.4 - 35.0 g/dL Final    RDW 02/09/2023 12.8  11.9 - 14.6 % Final    Platelets 51/03/8964 151  150 - 450 K/uL Final    MPV 02/09/2023 10.0  9.4 - 12.3 FL Final    nRBC 02/09/2023 0.00  0.0 - 0.2 K/uL Final    **Note: Absolute NRBC parameter is now reported with Hemogram**    Seg Neutrophils 02/09/2023 72  43 - 78 % Final    Lymphocytes 02/09/2023 16  13 - 44 % Final    Monocytes 02/09/2023 8  4.0 - 12.0 % Final    Eosinophils % 02/09/2023 2  0.5 - 7.8 % Final    Basophils 02/09/2023 1  0.0 - 2.0 % Final    Immature Granulocytes 02/09/2023 1  0.0 - 5.0 % Final    Segs Absolute 02/09/2023 4.8  1.7 - 8.2 K/UL Final    Absolute Lymph # 02/09/2023 1.1  0.5 - 4.6 K/UL Final    Absolute Mono # 02/09/2023 0.5  0.1 - 1.3 K/UL Final    Absolute Eos # 02/09/2023 0.1  0.0 - 0.8 K/UL Final    Basophils Absolute 02/09/2023 0.0  0.0 - 0.2 K/UL Final    Absolute Immature Granulocyte 02/09/2023 0.0  0.0 - 0.5 K/UL Final    Differential Type 02/09/2023 AUTOMATED    Final    Sodium 02/09/2023 141  133 - 143 mmol/L Final    Potassium 02/09/2023 4.8  3.5 - 5.1 mmol/L Final    Chloride 02/09/2023 109  101 - 110 mmol/L Final    CO2 02/09/2023 29  21 - 32 mmol/L Final    Anion Gap 02/09/2023 3  2 - 11 mmol/L Final    Glucose 02/09/2023 116 (A)  65 - 100 mg/dL Final    BUN 02/09/2023 22  8 - 23 MG/DL Final    Creatinine 02/09/2023 1.00  0.8 - 1.5 MG/DL Final    Est, Glom Filt Rate 02/09/2023 >60  >60 ml/min/1.73m2 Final    Comment:      Pediatric calculator link: Herbie.at. org/professionals/kdoqi/gfr_calculatorped       These results are not intended for use in patients <25years of age. eGFR results are calculated without a race factor using  the 2021 CKD-EPI equation. Careful clinical correlation is recommended, particularly when comparing to results calculated using previous equations. The CKD-EPI equation is less accurate in patients with extremes of muscle mass, extra-renal metabolism of creatinine, excessive creatine ingestion, or following therapy that affects renal tubular secretion. Calcium 02/09/2023 9.8  8.3 - 10.4 MG/DL Final    Total Bilirubin 02/09/2023 0.6  0.2 - 1.1 MG/DL Final    ALT 02/09/2023 14  12 - 65 U/L Final    AST 02/09/2023 14 (A)  15 - 37 U/L Final    Alk Phosphatase 02/09/2023 61  50 - 136 U/L Final    Total Protein 02/09/2023 7.1  6.3 - 8.2 g/dL Final    Albumin 02/09/2023 3.8  3.2 - 4.6 g/dL Final    Globulin 02/09/2023 3.3  2.8 - 4.5 g/dL Final    Albumin/Globulin Ratio 02/09/2023 1.2  0.4 - 1.6   Final    LD 02/09/2023 173  110 - 210 U/L Final    Reticulocyte Count,Automated 02/09/2023 1.3  0.3 - 2.0 % Final    Absolute Retic # 02/09/2023 0.0543  0.026 - 0.095 M/ul Final    Immature Retic Fraction 02/09/2023 8.6  2.3 - 13.4 % Final    Retic Hemoglobin conc.  02/09/2023 36 (A)  29 - 35 pg Final         Treatment Summary has been discussed and given to patient: n/a        -------------------------------------------------------------------------------------------------------------------  Please call our office at [phone] if you have any  of the following symptoms:   Fever of 100.5 or greater  Chills  Shortness of breath  Swelling or pain in one leg    After office hours an answering service is available and will contact a provider for emergencies or if you are experiencing any of the above symptoms. Patient did express an interest in My Chart. My Chart log in information explained on the after visit summary printout at the [facility] [address] desk.     Khang Valle RN

## 2023-03-24 ENCOUNTER — CLINICAL DOCUMENTATION (OUTPATIENT)
Dept: ONCOLOGY | Age: 78
End: 2023-03-24

## 2023-03-24 ENCOUNTER — TELEPHONE (OUTPATIENT)
Dept: ONCOLOGY | Age: 78
End: 2023-03-24

## 2023-03-24 DIAGNOSIS — J39.2 MASS OF THROAT: Primary | ICD-10-CM

## 2023-03-24 DIAGNOSIS — J39.2 THROAT MASS: ICD-10-CM

## 2023-03-24 NOTE — PROGRESS NOTES
Call to patient regarding increased size of growth in the back of throat. Patient states that he feels it is definitely not emergent and is following up with [facility] in May for evaluation and surgical intervention of growth. However, he reports an increase in size over the last week that has concerned him. Reports growth in throat/mouth that can block airway in certain positions. Denies any abnormal breathing, stridor, abnormal breath sounds. Educated patient that if he does feel stridor or any abnormalities in breath at any time to seek immediate care. Per Dr. Jonah Mercado recommendations, will refer to ENT to get evaluated before seeing [facility] in May. Pt VU of all of this and appreciated the call.

## 2023-04-04 ENCOUNTER — OFFICE VISIT (OUTPATIENT)
Dept: ENT CLINIC | Age: 78
End: 2023-04-04
Payer: MEDICARE

## 2023-04-04 VITALS — WEIGHT: 157 LBS | HEIGHT: 65 IN | BODY MASS INDEX: 26.16 KG/M2 | RESPIRATION RATE: 17 BRPM

## 2023-04-04 DIAGNOSIS — J39.2 THROAT MASS: Primary | ICD-10-CM

## 2023-04-04 PROCEDURE — 31575 DIAGNOSTIC LARYNGOSCOPY: CPT | Performed by: OTOLARYNGOLOGY

## 2023-04-04 PROCEDURE — 99204 OFFICE O/P NEW MOD 45 MIN: CPT | Performed by: OTOLARYNGOLOGY

## 2023-04-04 PROCEDURE — G8419 CALC BMI OUT NRM PARAM NOF/U: HCPCS | Performed by: OTOLARYNGOLOGY

## 2023-04-04 PROCEDURE — G8428 CUR MEDS NOT DOCUMENT: HCPCS | Performed by: OTOLARYNGOLOGY

## 2023-04-04 PROCEDURE — 3017F COLORECTAL CA SCREEN DOC REV: CPT | Performed by: OTOLARYNGOLOGY

## 2023-04-04 PROCEDURE — 1036F TOBACCO NON-USER: CPT | Performed by: OTOLARYNGOLOGY

## 2023-04-04 PROCEDURE — 1123F ACP DISCUSS/DSCN MKR DOCD: CPT | Performed by: OTOLARYNGOLOGY

## 2023-04-04 ASSESSMENT — ENCOUNTER SYMPTOMS
TROUBLE SWALLOWING: 1
VOICE CHANGE: 0
DIARRHEA: 0
WHEEZING: 0
EYE PAIN: 0
COUGH: 0
SORE THROAT: 0
VOMITING: 0
COLOR CHANGE: 0

## 2023-04-04 NOTE — PROGRESS NOTES
Chief Complaint   Patient presents with    Mass     Patient states that he is here due to a mass that he has in his throat that they had been keeping a eye on it and that within the last three weeks it has enlarge and is able to come out of his throat in to his mouth. HPI:  Ashlyn Philippe is a 78 y.o. male seen today in initial consultation for a left throat lesion. He has a very complicated oncologic history which can best summarized in Dr. Lenore Valverde most recent note. He has a history of a neuroendocrine lung cancer which was treated surgically up at the [facility] back in '18. He has since been diagnosed with follicular lymphoma. He has had multiple imaging studies both locally as well as at the [facility] and a CT neck had revealed a poss fluid collection or cystic mass in the hypopharynx. This mass has apparently been present for years on earlier imaging studies. He had seen an ENT at [facility] who performed laryngoscopy which revealed a mass within the larynx and hypopharynx. He had plans for repeat evaluation with a swallow study and further discussion of surgical excision up at [facility] next month. He comes in today as he had noted worsening left-sided dysphagia over the past several weeks. He even reports a globus sensation in his throat and has been able to almost cough up a cystic throat lesion into his oral cavity. He denies any chronic sore throat or voice changes. He has no dyspnea or hemoptysis. No recent imaging studies. He has underlying KARAN and sleeps with a CPAP    Past Medical History, Past Surgical History, Family history, Social History, and Medications were all reviewed with the patient today and updated as necessary.      Allergies   Allergen Reactions    Oxycodone-Acetaminophen Itching    Sulfa Antibiotics Itching    Sulfasalazine Other (See Comments)    Tyloxapol Other (See Comments)     Patient Active Problem List   Diagnosis    Lung mass    S/P lobectomy of lung

## 2023-07-11 ENCOUNTER — TELEPHONE (OUTPATIENT)
Dept: ONCOLOGY | Age: 78
End: 2023-07-11

## 2023-07-11 DIAGNOSIS — C82.98 FOLLICULAR LYMPHOMA OF LYMPH NODES OF MULTIPLE REGIONS, UNSPECIFIED FOLLICULAR LYMPHOMA TYPE (HCC): Primary | ICD-10-CM

## 2023-07-13 ENCOUNTER — HOSPITAL ENCOUNTER (OUTPATIENT)
Dept: LAB | Age: 78
Discharge: HOME | End: 2023-07-13
Payer: MEDICARE

## 2023-07-13 DIAGNOSIS — C82.98 FOLLICULAR LYMPHOMA OF LYMPH NODES OF MULTIPLE REGIONS, UNSPECIFIED FOLLICULAR LYMPHOMA TYPE (HCC): ICD-10-CM

## 2023-07-13 LAB
BASOPHILS # BLD: 0 K/UL (ref 0–0.2)
BASOPHILS NFR BLD: 0 % (ref 0–2)
DIFFERENTIAL METHOD BLD: ABNORMAL
EOSINOPHIL # BLD: 0.3 K/UL (ref 0–0.8)
EOSINOPHIL NFR BLD: 1 % (ref 0.5–7.8)
ERYTHROCYTE [DISTWIDTH] IN BLOOD BY AUTOMATED COUNT: 13.4 % (ref 11.9–14.6)
HCT VFR BLD AUTO: 38 % (ref 41.1–50.3)
HGB BLD-MCNC: 12.5 G/DL (ref 13.6–17.2)
IMM GRANULOCYTES # BLD AUTO: 1.6 K/UL (ref 0–0.5)
IMM GRANULOCYTES NFR BLD AUTO: 6 % (ref 0–5)
LYMPHOCYTES # BLD: 0.3 K/UL (ref 0.5–4.6)
LYMPHOCYTES NFR BLD: 1 % (ref 13–44)
MCH RBC QN AUTO: 30.2 PG (ref 26.1–32.9)
MCHC RBC AUTO-ENTMCNC: 32.9 G/DL (ref 31.4–35)
MCV RBC AUTO: 91.8 FL (ref 82–102)
MONOCYTES # BLD: 3.2 K/UL (ref 0.1–1.3)
MONOCYTES NFR BLD: 12 % (ref 4–12)
NEUTS SEG # BLD: 21.4 K/UL (ref 1.7–8.2)
NEUTS SEG NFR BLD: 80 % (ref 43–78)
NRBC # BLD: 0 K/UL (ref 0–0.2)
PLATELET # BLD AUTO: 108 K/UL (ref 150–450)
PLATELET COMMENT: ABNORMAL
PMV BLD AUTO: 10.1 FL (ref 9.4–12.3)
RBC # BLD AUTO: 4.14 M/UL (ref 4.23–5.6)
RBC MORPH BLD: ABNORMAL
WBC # BLD AUTO: 26.8 K/UL (ref 4.3–11.1)
WBC MORPH BLD: ABNORMAL

## 2023-07-13 PROCEDURE — 85025 COMPLETE CBC W/AUTO DIFF WBC: CPT

## 2023-07-13 PROCEDURE — 36415 COLL VENOUS BLD VENIPUNCTURE: CPT

## 2023-07-14 ENCOUNTER — CLINICAL DOCUMENTATION (OUTPATIENT)
Dept: ONCOLOGY | Age: 78
End: 2023-07-14

## 2023-07-14 NOTE — PROGRESS NOTES
Call to patient in regards to CBC done here while getting infusions for lymphoma at [facility]. Pt's WBC had increased but pt reports feeling fine - no signs of fever, chills, infection, etc.  Pt states he just came back from a walk and walked about 2 miles. Pt reports he did get a Nulasta injection after his infusion so he thought that could also be related to the increased WBC. Pt reports he needs to r/s his 8/29 appt as he will be at [facility] for infusions that week and they will be doing a PET scan at that time to determine if treatment has been effective. Will r/s appt per patient request.  Pt VU and greatly appreciated the call. Encouraged pt to let us know if he has additional questions or needs anything else in the mean time.

## 2023-07-20 ENCOUNTER — HOSPITAL ENCOUNTER (OUTPATIENT)
Dept: LAB | Age: 78
Discharge: HOME | End: 2023-07-20
Payer: MEDICARE

## 2023-07-20 DIAGNOSIS — C82.98 FOLLICULAR LYMPHOMA OF LYMPH NODES OF MULTIPLE REGIONS, UNSPECIFIED FOLLICULAR LYMPHOMA TYPE (HCC): ICD-10-CM

## 2023-07-20 LAB
BASOPHILS # BLD: 0.1 K/UL (ref 0–0.2)
BASOPHILS NFR BLD: 1 % (ref 0–2)
DIFFERENTIAL METHOD BLD: ABNORMAL
EOSINOPHIL # BLD: 0.1 K/UL (ref 0–0.8)
EOSINOPHIL NFR BLD: 1 % (ref 0.5–7.8)
ERYTHROCYTE [DISTWIDTH] IN BLOOD BY AUTOMATED COUNT: 13.3 % (ref 11.9–14.6)
HCT VFR BLD AUTO: 38.7 % (ref 41.1–50.3)
HGB BLD-MCNC: 12.6 G/DL (ref 13.6–17.2)
IMM GRANULOCYTES # BLD AUTO: 0.1 K/UL (ref 0–0.5)
IMM GRANULOCYTES NFR BLD AUTO: 1 % (ref 0–5)
LYMPHOCYTES # BLD: 0.3 K/UL (ref 0.5–4.6)
LYMPHOCYTES NFR BLD: 4 % (ref 13–44)
MCH RBC QN AUTO: 29.8 PG (ref 26.1–32.9)
MCHC RBC AUTO-ENTMCNC: 32.6 G/DL (ref 31.4–35)
MCV RBC AUTO: 91.5 FL (ref 82–102)
MONOCYTES # BLD: 0.8 K/UL (ref 0.1–1.3)
MONOCYTES NFR BLD: 10 % (ref 4–12)
NEUTS SEG # BLD: 7.1 K/UL (ref 1.7–8.2)
NEUTS SEG NFR BLD: 83 % (ref 43–78)
NRBC # BLD: 0 K/UL (ref 0–0.2)
PLATELET # BLD AUTO: 144 K/UL (ref 150–450)
PMV BLD AUTO: 10.5 FL (ref 9.4–12.3)
RBC # BLD AUTO: 4.23 M/UL (ref 4.23–5.6)
WBC # BLD AUTO: 8.5 K/UL (ref 4.3–11.1)

## 2023-07-20 PROCEDURE — 85025 COMPLETE CBC W/AUTO DIFF WBC: CPT

## 2023-07-20 PROCEDURE — 36415 COLL VENOUS BLD VENIPUNCTURE: CPT

## 2023-07-27 ENCOUNTER — HOSPITAL ENCOUNTER (OUTPATIENT)
Dept: LAB | Age: 78
Discharge: HOME | End: 2023-07-27
Payer: MEDICARE

## 2023-07-27 DIAGNOSIS — C82.98 FOLLICULAR LYMPHOMA OF LYMPH NODES OF MULTIPLE REGIONS, UNSPECIFIED FOLLICULAR LYMPHOMA TYPE (HCC): ICD-10-CM

## 2023-07-27 LAB
BASOPHILS # BLD: 0.1 K/UL (ref 0–0.2)
BASOPHILS NFR BLD: 2 % (ref 0–2)
DIFFERENTIAL METHOD BLD: ABNORMAL
EOSINOPHIL # BLD: 0 K/UL (ref 0–0.8)
EOSINOPHIL NFR BLD: 1 % (ref 0.5–7.8)
ERYTHROCYTE [DISTWIDTH] IN BLOOD BY AUTOMATED COUNT: 13.7 % (ref 11.9–14.6)
HCT VFR BLD AUTO: 35.4 % (ref 41.1–50.3)
HGB BLD-MCNC: 11.6 G/DL (ref 13.6–17.2)
IMM GRANULOCYTES # BLD AUTO: 0 K/UL (ref 0–0.5)
IMM GRANULOCYTES NFR BLD AUTO: 0 % (ref 0–5)
LYMPHOCYTES # BLD: 0.4 K/UL (ref 0.5–4.6)
LYMPHOCYTES NFR BLD: 7 % (ref 13–44)
MCH RBC QN AUTO: 30.3 PG (ref 26.1–32.9)
MCHC RBC AUTO-ENTMCNC: 32.8 G/DL (ref 31.4–35)
MCV RBC AUTO: 92.4 FL (ref 82–102)
MONOCYTES # BLD: 0.7 K/UL (ref 0.1–1.3)
MONOCYTES NFR BLD: 14 % (ref 4–12)
NEUTS SEG # BLD: 3.8 K/UL (ref 1.7–8.2)
NEUTS SEG NFR BLD: 77 % (ref 43–78)
NRBC # BLD: 0 K/UL (ref 0–0.2)
PLATELET # BLD AUTO: 149 K/UL (ref 150–450)
PMV BLD AUTO: 9.7 FL (ref 9.4–12.3)
RBC # BLD AUTO: 3.83 M/UL (ref 4.23–5.6)
WBC # BLD AUTO: 5 K/UL (ref 4.3–11.1)

## 2023-07-27 PROCEDURE — 85025 COMPLETE CBC W/AUTO DIFF WBC: CPT

## 2023-07-27 PROCEDURE — 36415 COLL VENOUS BLD VENIPUNCTURE: CPT

## 2023-08-10 ENCOUNTER — TELEPHONE (OUTPATIENT)
Dept: RADIATION ONCOLOGY | Age: 78
End: 2023-08-10

## 2023-08-10 NOTE — TELEPHONE ENCOUNTER
Pt returned call and wanted to talk to someone re: the appointment change that was made for the September 14th visit.

## 2023-09-14 ENCOUNTER — HOSPITAL ENCOUNTER (OUTPATIENT)
Dept: LAB | Age: 78
Discharge: HOME | End: 2023-09-14
Payer: MEDICARE

## 2023-09-14 ENCOUNTER — OFFICE VISIT (OUTPATIENT)
Dept: ONCOLOGY | Age: 78
End: 2023-09-14
Payer: MEDICARE

## 2023-09-14 VITALS
DIASTOLIC BLOOD PRESSURE: 64 MMHG | SYSTOLIC BLOOD PRESSURE: 116 MMHG | WEIGHT: 143.5 LBS | HEIGHT: 65 IN | BODY MASS INDEX: 23.91 KG/M2 | RESPIRATION RATE: 16 BRPM | OXYGEN SATURATION: 98 % | TEMPERATURE: 98 F | HEART RATE: 78 BPM

## 2023-09-14 DIAGNOSIS — D64.9 ANEMIA, UNSPECIFIED TYPE: ICD-10-CM

## 2023-09-14 DIAGNOSIS — R74.8 ELEVATED VITAMIN B12 LEVEL: ICD-10-CM

## 2023-09-14 DIAGNOSIS — C82.98 FOLLICULAR LYMPHOMA OF LYMPH NODES OF MULTIPLE REGIONS, UNSPECIFIED FOLLICULAR LYMPHOMA TYPE (HCC): Primary | ICD-10-CM

## 2023-09-14 DIAGNOSIS — R68.89 OTHER GENERAL SYMPTOMS AND SIGNS: ICD-10-CM

## 2023-09-14 LAB
ALBUMIN SERPL-MCNC: 3.9 G/DL (ref 3.2–4.6)
ALBUMIN/GLOB SERPL: 1.1 (ref 0.4–1.6)
ALP SERPL-CCNC: 98 U/L (ref 50–136)
ALT SERPL-CCNC: 13 U/L (ref 12–65)
ANION GAP SERPL CALC-SCNC: 2 MMOL/L (ref 2–11)
AST SERPL-CCNC: 13 U/L (ref 15–37)
BASOPHILS # BLD: 0 K/UL (ref 0–0.2)
BASOPHILS NFR BLD: 1 % (ref 0–2)
BILIRUB SERPL-MCNC: 0.6 MG/DL (ref 0.2–1.1)
BUN SERPL-MCNC: 21 MG/DL (ref 8–23)
CALCIUM SERPL-MCNC: 9.6 MG/DL (ref 8.3–10.4)
CHLORIDE SERPL-SCNC: 105 MMOL/L (ref 101–110)
CO2 SERPL-SCNC: 31 MMOL/L (ref 21–32)
CREAT SERPL-MCNC: 0.9 MG/DL (ref 0.8–1.5)
DIFFERENTIAL METHOD BLD: ABNORMAL
EOSINOPHIL # BLD: 0.1 K/UL (ref 0–0.8)
EOSINOPHIL NFR BLD: 1 % (ref 0.5–7.8)
ERYTHROCYTE [DISTWIDTH] IN BLOOD BY AUTOMATED COUNT: 13.9 % (ref 11.9–14.6)
GLOBULIN SER CALC-MCNC: 3.4 G/DL (ref 2.8–4.5)
GLUCOSE SERPL-MCNC: 100 MG/DL (ref 65–100)
HCT VFR BLD AUTO: 37.4 % (ref 41.1–50.3)
HGB BLD-MCNC: 12.5 G/DL (ref 13.6–17.2)
IMM GRANULOCYTES # BLD AUTO: 0 K/UL (ref 0–0.5)
IMM GRANULOCYTES NFR BLD AUTO: 1 % (ref 0–5)
LDH SERPL L TO P-CCNC: 177 U/L (ref 110–210)
LYMPHOCYTES # BLD: 0.2 K/UL (ref 0.5–4.6)
LYMPHOCYTES NFR BLD: 3 % (ref 13–44)
MCH RBC QN AUTO: 31.1 PG (ref 26.1–32.9)
MCHC RBC AUTO-ENTMCNC: 33.4 G/DL (ref 31.4–35)
MCV RBC AUTO: 93 FL (ref 82–102)
MONOCYTES # BLD: 0.6 K/UL (ref 0.1–1.3)
MONOCYTES NFR BLD: 9 % (ref 4–12)
NEUTS SEG # BLD: 6.1 K/UL (ref 1.7–8.2)
NEUTS SEG NFR BLD: 85 % (ref 43–78)
NRBC # BLD: 0 K/UL (ref 0–0.2)
PLATELET # BLD AUTO: 126 K/UL (ref 150–450)
PMV BLD AUTO: 9.8 FL (ref 9.4–12.3)
POTASSIUM SERPL-SCNC: 4.4 MMOL/L (ref 3.5–5.1)
PROT SERPL-MCNC: 7.3 G/DL (ref 6.3–8.2)
RBC # BLD AUTO: 4.02 M/UL (ref 4.23–5.6)
SODIUM SERPL-SCNC: 138 MMOL/L (ref 133–143)
VIT B12 SERPL-MCNC: >6000 PG/ML (ref 193–986)
WBC # BLD AUTO: 7.1 K/UL (ref 4.3–11.1)

## 2023-09-14 PROCEDURE — 1123F ACP DISCUSS/DSCN MKR DOCD: CPT

## 2023-09-14 PROCEDURE — 83615 LACTATE (LD) (LDH) ENZYME: CPT

## 2023-09-14 PROCEDURE — 36415 COLL VENOUS BLD VENIPUNCTURE: CPT

## 2023-09-14 PROCEDURE — 82607 VITAMIN B-12: CPT

## 2023-09-14 PROCEDURE — 85025 COMPLETE CBC W/AUTO DIFF WBC: CPT

## 2023-09-14 PROCEDURE — 99214 OFFICE O/P EST MOD 30 MIN: CPT

## 2023-09-14 PROCEDURE — 80053 COMPREHEN METABOLIC PANEL: CPT

## 2023-09-14 RX ORDER — LORATADINE 10 MG/1
10 TABLET ORAL DAILY
COMMUNITY

## 2023-09-14 RX ORDER — ASCORBIC ACID 250 MG
250 TABLET ORAL DAILY
COMMUNITY

## 2023-09-14 RX ORDER — ACYCLOVIR 400 MG/1
400 TABLET ORAL 2 TIMES DAILY
COMMUNITY
Start: 2023-08-30

## 2023-09-14 RX ORDER — ONDANSETRON HYDROCHLORIDE 8 MG/1
TABLET, FILM COATED ORAL
COMMUNITY
Start: 2023-07-24

## 2023-09-14 ASSESSMENT — ENCOUNTER SYMPTOMS
HEMOPTYSIS: 0
ABDOMINAL PAIN: 0
BACK PAIN: 0
ABDOMINAL DISTENTION: 0
VOMITING: 0
CONSTIPATION: 0
EYE PROBLEMS: 0
SHORTNESS OF BREATH: 0
DIARRHEA: 0
SCLERAL ICTERUS: 0
BLOOD IN STOOL: 0
NAUSEA: 0
SORE THROAT: 0
COUGH: 0

## 2023-09-14 ASSESSMENT — PATIENT HEALTH QUESTIONNAIRE - PHQ9: DEPRESSION UNABLE TO ASSESS: PT REFUSES

## 2023-09-14 NOTE — PROGRESS NOTES
determine if sx is needed/indicated. - here for FU after CT. Ct showed progressive LAD. He reviewed this with Dr Wiley Worthy at [facility] (virtual visit). He communicated with Dr Evaristo Ferreira and plan will be to repeat imaging at [facility] in May. Will c/w surveillance. No B symptoms.    - Labs reviewed and stable anemia noted - checking nutritional studies. We also reviewed typical indications for tx, he and wife VU. They are in agreement with this plan. -During today's consultation, we discussed the pathophysiology of hematopoiesis, as well as, follicular lymphoma. We discussed staging, grade as well as risk. We also reviewed indications for treatment. He is a patient of Dr. Denae Leon at [facility] in [facility]. No current B symptoms. - planned CT is tomorrow - anemia - drop in Hb to 12.6 noted on Nov OSH labs - will p/w nutritional labs today and smear; plts normal, wbc normal - planning to establish surveillance sched: sees PCP in Nov, will FU with me in Feb, then Gudino in May/JUne, then with me in [address] [place] [address], and back to PCP in Nov.      All questions were asked and answered to the best of my ability. The patient verbalized understanding and agrees with the plan above.             SOFIA [place] - [facility]  [address]  Office : [phone]  Fax : [phone]

## 2023-11-07 ENCOUNTER — NURSE ONLY (OUTPATIENT)
Dept: UROLOGY | Age: 78
End: 2023-11-07

## 2023-11-07 ENCOUNTER — APPOINTMENT (RX ONLY)
Dept: URBAN - METROPOLITAN AREA CLINIC 23 | Facility: CLINIC | Age: 78
Setting detail: DERMATOLOGY
End: 2023-11-07

## 2023-11-07 DIAGNOSIS — L82.1 OTHER SEBORRHEIC KERATOSIS: ICD-10-CM

## 2023-11-07 DIAGNOSIS — N40.1 BENIGN PROSTATIC HYPERPLASIA WITH LOWER URINARY TRACT SYMPTOMS, SYMPTOM DETAILS UNSPECIFIED: ICD-10-CM

## 2023-11-07 DIAGNOSIS — L30.9 DERMATITIS, UNSPECIFIED: ICD-10-CM

## 2023-11-07 DIAGNOSIS — L57.8 OTHER SKIN CHANGES DUE TO CHRONIC EXPOSURE TO NONIONIZING RADIATION: ICD-10-CM

## 2023-11-07 DIAGNOSIS — N52.9 ERECTILE DYSFUNCTION, UNSPECIFIED ERECTILE DYSFUNCTION TYPE: ICD-10-CM

## 2023-11-07 DIAGNOSIS — L57.0 ACTINIC KERATOSIS: ICD-10-CM

## 2023-11-07 DIAGNOSIS — L72.0 EPIDERMAL CYST: ICD-10-CM

## 2023-11-07 DIAGNOSIS — D18.0 HEMANGIOMA: ICD-10-CM

## 2023-11-07 PROBLEM — D18.01 HEMANGIOMA OF SKIN AND SUBCUTANEOUS TISSUE: Status: ACTIVE | Noted: 2023-11-07

## 2023-11-07 LAB — PSA SERPL-MCNC: 5.4 NG/ML

## 2023-11-07 PROCEDURE — ? COUNSELING

## 2023-11-07 PROCEDURE — 99213 OFFICE O/P EST LOW 20 MIN: CPT | Mod: 25

## 2023-11-07 PROCEDURE — ? PRESCRIPTION

## 2023-11-07 PROCEDURE — 17000 DESTRUCT PREMALG LESION: CPT

## 2023-11-07 PROCEDURE — ? PRESCRIPTION MEDICATION MANAGEMENT

## 2023-11-07 PROCEDURE — ? LIQUID NITROGEN

## 2023-11-07 RX ORDER — TADALAFIL 5 MG/1
5 TABLET ORAL DAILY
Qty: 90 TABLET | Refills: 3 | Status: SHIPPED | OUTPATIENT
Start: 2023-11-07

## 2023-11-07 RX ORDER — ALFUZOSIN HYDROCHLORIDE 10 MG/1
10 TABLET, EXTENDED RELEASE ORAL DAILY
Qty: 90 TABLET | Refills: 3 | Status: SHIPPED | OUTPATIENT
Start: 2023-11-07

## 2023-11-07 RX ORDER — HYDROCORTISONE 25 MG/G
CREAM TOPICAL
Qty: 30 | Refills: 0 | Status: ERX | COMMUNITY
Start: 2023-11-07

## 2023-11-07 RX ADMIN — HYDROCORTISONE: 25 CREAM TOPICAL at 00:00

## 2023-11-07 ASSESSMENT — LOCATION DETAILED DESCRIPTION DERM
LOCATION DETAILED: EPIGASTRIC SKIN
LOCATION DETAILED: LEFT MEDIAL INFERIOR CHEST
LOCATION DETAILED: RIGHT SUPERIOR MEDIAL MIDBACK
LOCATION DETAILED: LEFT PROXIMAL DORSAL FOREARM
LOCATION DETAILED: RIGHT PROXIMAL DORSAL FOREARM
LOCATION DETAILED: LEFT CENTRAL ZYGOMA
LOCATION DETAILED: RIGHT INFERIOR CENTRAL MALAR CHEEK
LOCATION DETAILED: LEFT INFERIOR MEDIAL MALAR CHEEK
LOCATION DETAILED: MID POSTERIOR NECK
LOCATION DETAILED: LEFT MEDIAL MALAR CHEEK
LOCATION DETAILED: INFERIOR THORACIC SPINE

## 2023-11-07 ASSESSMENT — LOCATION ZONE DERM
LOCATION ZONE: FACE
LOCATION ZONE: TRUNK
LOCATION ZONE: NECK
LOCATION ZONE: ARM

## 2023-11-07 ASSESSMENT — LOCATION SIMPLE DESCRIPTION DERM
LOCATION SIMPLE: LEFT ZYGOMA
LOCATION SIMPLE: ABDOMEN
LOCATION SIMPLE: RIGHT LOWER BACK
LOCATION SIMPLE: RIGHT FOREARM
LOCATION SIMPLE: LEFT CHEEK
LOCATION SIMPLE: CHEST
LOCATION SIMPLE: POSTERIOR NECK
LOCATION SIMPLE: RIGHT CHEEK
LOCATION SIMPLE: LEFT FOREARM
LOCATION SIMPLE: UPPER BACK

## 2023-11-07 NOTE — HPI: SECONDARY COMPLAINT
How Severe Is This Condition?: mild
Additional History: Wears CPAP on face and uses a sterilizer on the face mask and could be irritating to the face.

## 2023-11-07 NOTE — PROCEDURE: PRESCRIPTION MEDICATION MANAGEMENT
Render In Strict Bullet Format?: No
Detail Level: Zone
Initiate Treatment: Hydrocortisone bid- call if no better in 1 month

## 2023-11-14 ENCOUNTER — OFFICE VISIT (OUTPATIENT)
Dept: UROLOGY | Age: 78
End: 2023-11-14
Payer: MEDICARE

## 2023-11-14 DIAGNOSIS — N52.9 ERECTILE DYSFUNCTION, UNSPECIFIED ERECTILE DYSFUNCTION TYPE: ICD-10-CM

## 2023-11-14 DIAGNOSIS — R97.20 ELEVATED PSA: ICD-10-CM

## 2023-11-14 DIAGNOSIS — N40.1 BENIGN PROSTATIC HYPERPLASIA WITH LOWER URINARY TRACT SYMPTOMS, SYMPTOM DETAILS UNSPECIFIED: Primary | ICD-10-CM

## 2023-11-14 LAB
BILIRUBIN, URINE, POC: NEGATIVE
BLOOD URINE, POC: NORMAL
GLUCOSE URINE, POC: NEGATIVE
KETONES, URINE, POC: NEGATIVE
LEUKOCYTE ESTERASE, URINE, POC: NEGATIVE
NITRITE, URINE, POC: NEGATIVE
PH, URINE, POC: 6 (ref 4.6–8)
PROTEIN,URINE, POC: NEGATIVE
SPECIFIC GRAVITY, URINE, POC: 1.01 (ref 1–1.03)
URINALYSIS CLARITY, POC: NORMAL
URINALYSIS COLOR, POC: NORMAL
UROBILINOGEN, POC: NORMAL

## 2023-11-14 PROCEDURE — 1123F ACP DISCUSS/DSCN MKR DOCD: CPT | Performed by: UROLOGY

## 2023-11-14 PROCEDURE — 81003 URINALYSIS AUTO W/O SCOPE: CPT | Performed by: UROLOGY

## 2023-11-14 PROCEDURE — 1036F TOBACCO NON-USER: CPT | Performed by: UROLOGY

## 2023-11-14 PROCEDURE — G8427 DOCREV CUR MEDS BY ELIG CLIN: HCPCS | Performed by: UROLOGY

## 2023-11-14 PROCEDURE — G8420 CALC BMI NORM PARAMETERS: HCPCS | Performed by: UROLOGY

## 2023-11-14 PROCEDURE — G8484 FLU IMMUNIZE NO ADMIN: HCPCS | Performed by: UROLOGY

## 2023-11-14 PROCEDURE — 99214 OFFICE O/P EST MOD 30 MIN: CPT | Performed by: UROLOGY

## 2023-11-14 RX ORDER — DUTASTERIDE 0.5 MG/1
0.5 CAPSULE, LIQUID FILLED ORAL DAILY
Qty: 90 CAPSULE | Refills: 3 | Status: SHIPPED | OUTPATIENT
Start: 2023-11-14

## 2023-11-14 RX ORDER — ALFUZOSIN HYDROCHLORIDE 10 MG/1
10 TABLET, EXTENDED RELEASE ORAL DAILY
Qty: 90 TABLET | Refills: 3 | Status: SHIPPED | OUTPATIENT
Start: 2023-11-14

## 2023-11-14 RX ORDER — TADALAFIL 5 MG/1
5 TABLET ORAL DAILY
Qty: 90 TABLET | Refills: 3 | Status: SHIPPED | OUTPATIENT
Start: 2023-11-14

## 2023-11-14 ASSESSMENT — ENCOUNTER SYMPTOMS: BACK PAIN: 0

## 2023-11-14 NOTE — PROGRESS NOTES
[address]  [phone]          Cristel Royal  : 1945    Chief Complaint   Patient presents with    Follow-up     yearly          HPI     Cristel Royal is a 70 y.o. male (Creen-oh) who transferred care in 10/19 from Dr. Darby Patterson. He is s/p L lobectomy at [facility] 18 due to carcinoid. He did have a + node. Pet Scan thereafter revealed a ? 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 Don Bojorquez at [facility]. It returned negative. He is on alfuzosin/dutasteride/cialis 5mg in regards to LUTS. Voiding is satisfactory. He has some minor dribbling. 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 He has been diagnosed with a follicular lymphoma, found as intraperitoneal enlarged lymph node. It is followed by Dr. Lesly Abdi, [facility]. 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        PSA:  2.2, 10/20 2.1, 10/21 2.9, 10/22 2.3,  5.4             Past Medical History:   Diagnosis Date    CAD (coronary artery disease)     Elevated PSA     Hypercholesterolemia     Neuroendocrine carcinoma of lung ([address])      Past Surgical History:   Procedure Laterality Date    CATARACT REMOVAL      LOBECTOMY Left     ORTHOPEDIC SURGERY      right hand surgery    TONSILLECTOMY       Current Outpatient Medications   Medication Sig Dispense Refill    alfuzosin (UROXATRAL) 10 MG extended release tablet Take 1 tablet by mouth daily 90 tablet 3    dutasteride (AVODART) 0.5 MG capsule Take 1 capsule by mouth daily 90 capsule 3    tadalafil (CIALIS) 5 MG tablet Take 1 tablet by mouth daily 90 tablet 3    acyclovir (ZOVIRAX) 400 MG tablet Take 1 tablet by mouth 2 times daily      ascorbic acid (VITAMIN C) 250 MG tablet Take 1 tablet by mouth daily      loratadine (CLARITIN) 10 MG tablet Take 1 tablet by mouth daily

## 2023-11-16 DIAGNOSIS — Z29.89 NEED FOR PNEUMOCYSTIS PROPHYLAXIS: ICD-10-CM

## 2023-11-16 DIAGNOSIS — C82.98 FOLLICULAR LYMPHOMA OF LYMPH NODES OF MULTIPLE REGIONS, UNSPECIFIED FOLLICULAR LYMPHOMA TYPE (HCC): Primary | ICD-10-CM

## 2023-11-16 DIAGNOSIS — Z79.899 HIGH RISK MEDICATION USE: ICD-10-CM

## 2023-12-08 ENCOUNTER — CLINICAL DOCUMENTATION (OUTPATIENT)
Dept: PULMONOLOGY | Age: 78
End: 2023-12-08

## 2023-12-08 NOTE — PROGRESS NOTES
Geovanni Allison requested assistance with referral from Dr Trav Faulkner: I have spoken with inpatient RRT Stephanie Slater, she performs Pentamidine in PFT lab but must be last patient of day. Spoke with Dr Nathaniel Choudhary who agrees that patient needs to be seen in office prior to arranging this therapy. LOV 10/22/2022 Dr Galo Myles     Per [facility] oncology notes:  8/1/2018 Biopsy/Pathology   Pathology demonstrated 2.1 x 1.7 x 1.5 cm typical carcinoid with a single intrapulmonary lymph node positive for metastatic tumor, making his stage a [id number], stage IIB. Follicular Lymphoma Unspecified Intra Abdominal Lymph Nodes ([address])   12/20/2019 Biopsy/Pathology   Mesenteric lymph node       If Dr Giovanna Potter approves Pentamidine post appointment-need to notify Miguelangel Juan of scheduled dates to obtain medication. Must be last patient scheduled in PFT. LMTCB

## 2023-12-14 ENCOUNTER — OFFICE VISIT (OUTPATIENT)
Dept: PULMONOLOGY | Age: 78
End: 2023-12-14
Payer: MEDICARE

## 2023-12-14 VITALS
WEIGHT: 147 LBS | TEMPERATURE: 98 F | DIASTOLIC BLOOD PRESSURE: 60 MMHG | RESPIRATION RATE: 20 BRPM | SYSTOLIC BLOOD PRESSURE: 110 MMHG | OXYGEN SATURATION: 96 % | BODY MASS INDEX: 23.63 KG/M2 | HEART RATE: 67 BPM | HEIGHT: 66 IN

## 2023-12-14 DIAGNOSIS — C82.90 FOLLICULAR LYMPHOMA, UNSPECIFIED FOLLICULAR LYMPHOMA TYPE, UNSPECIFIED BODY REGION (HCC): Primary | ICD-10-CM

## 2023-12-14 DIAGNOSIS — Z85.9 HISTORY OF MALIGNANT CARCINOID TUMOR: ICD-10-CM

## 2023-12-14 DIAGNOSIS — B59 PNEUMONIA DUE TO PNEUMOCYSTIS JIROVECII, UNSPECIFIED LATERALITY, UNSPECIFIED PART OF LUNG (HCC): ICD-10-CM

## 2023-12-14 PROCEDURE — 99214 OFFICE O/P EST MOD 30 MIN: CPT | Performed by: INTERNAL MEDICINE

## 2023-12-14 PROCEDURE — 1036F TOBACCO NON-USER: CPT | Performed by: INTERNAL MEDICINE

## 2023-12-14 PROCEDURE — G8420 CALC BMI NORM PARAMETERS: HCPCS | Performed by: INTERNAL MEDICINE

## 2023-12-14 PROCEDURE — G8484 FLU IMMUNIZE NO ADMIN: HCPCS | Performed by: INTERNAL MEDICINE

## 2023-12-14 PROCEDURE — 1123F ACP DISCUSS/DSCN MKR DOCD: CPT | Performed by: INTERNAL MEDICINE

## 2023-12-14 PROCEDURE — G8427 DOCREV CUR MEDS BY ELIG CLIN: HCPCS | Performed by: INTERNAL MEDICINE

## 2023-12-14 RX ORDER — ALBUTEROL SULFATE 2.5 MG/3ML
2.5 SOLUTION RESPIRATORY (INHALATION) ONCE
Status: SHIPPED | OUTPATIENT
Start: 2023-12-29

## 2023-12-14 RX ORDER — ALBUTEROL SULFATE 2.5 MG/3ML
2.5 SOLUTION RESPIRATORY (INHALATION) ONCE
Status: SHIPPED | OUTPATIENT
Start: 2024-02-28

## 2023-12-14 NOTE — PROGRESS NOTES
Name:  Geo Lowe  YOB: 1945   MRN: [id number]      Office Visit: 12/14/2023        ASSESSMENT AND PLAN:  (Medical Decision Making)    Impression: 70 y.o. male with complicated cancer history, initially atypical pulmonary carcinoid resected in 6399, then follicular lymphoma, then liposarcoma May 2023. Undergoing chemo for lymphoma with [facility]. Was started on PJP prophylaxis with monthly pentamidine nebs in July 2023. Asked to continue this for him in December and February.     -working with hospital PFT lab to schedule albuterol 2.5 neb and pentamidine neb 300mg for Dec 29 and Feb 28.   -cont radiographic monitoring of recurrence of carcinoid. -will plan to follow up with 3 month office appt unless he needs us before then. 1. Follicular lymphoma, unspecified follicular lymphoma type, unspecified body region ([address])    - albuterol (PROVENTIL) (2.5 MG/3ML) 0.083% nebulizer solution 2.5 mg  - albuterol (PROVENTIL) (2.5 MG/3ML) 0.083% nebulizer solution 2.5 mg  - WA PENTAMIDINE AERSL INHALATION PNEUMOCYSTIS/PROPH    2. 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  History of malignant carcinoid tumor      Orders Placed This Encounter   Medications    albuterol (PROVENTIL) (2.5 MG/3ML) 0.083% nebulizer solution 2.5 mg     Order Specific Question:   Initiate RT Bronchodilator Protocol     Answer:   Yes - Inpatient Protocol    albuterol (PROVENTIL) (2.5 MG/3ML) 0.083% nebulizer solution 2.5 mg     Order Specific Question:   Initiate RT Bronchodilator Protocol     Answer:   Yes - Inpatient Protocol         Procedures    WA PENTAMIDINE AERSL INHALATION PNEUMOCYSTIS/PROPH     Administer 12/29/23 and 2/28/24       Lakeisha Snow MD    No

## 2023-12-29 ENCOUNTER — HOSPITAL ENCOUNTER (OUTPATIENT)
Dept: PULMONOLOGY | Age: 78
End: 2023-12-29
Payer: MEDICARE

## 2023-12-29 VITALS — HEART RATE: 89 BPM | OXYGEN SATURATION: 99 % | RESPIRATION RATE: 16 BRPM

## 2023-12-29 PROCEDURE — 94640 AIRWAY INHALATION TREATMENT: CPT

## 2023-12-29 PROCEDURE — 94642 AEROSOL INHALATION TREATMENT: CPT

## 2024-01-11 ENCOUNTER — TELEPHONE (OUTPATIENT)
Dept: ONCOLOGY | Age: 79
End: 2024-01-11

## 2024-01-11 LAB — PROSTATE SPECIFIC ANTIGEN: 1.63 NG/ML

## 2024-01-11 NOTE — TELEPHONE ENCOUNTER
Physician provider: Emely Dumont MD  Reason for today's call: Labs  Last office visit:09/14/23    Patient notified that their information will be routed to the [facility] clinical triage team for review. Patient is advised that they will receive a phone call from the triage department.       Received call from Dr. Ashlyn Alcazar office & they want to verify if  they can add  on additional Labs for Pt upcoming labs at our office on 02/09. Dr. Alcazar will go ahead & fax Order for labs today to Dr. Dumont.      Dr. Ashlyn Alcazar [facility]  [phone]

## 2024-01-31 DIAGNOSIS — C82.98 FOLLICULAR LYMPHOMA OF LYMPH NODES OF MULTIPLE REGIONS, UNSPECIFIED FOLLICULAR LYMPHOMA TYPE (HCC): Primary | ICD-10-CM

## 2024-01-31 NOTE — PROGRESS NOTES
[facility] Hematology and Oncology: Established patient - follow up     Chief Complaint   Patient presents with    Follow-up     Reason for Referral: Lung mass  --> actually here for follicular lymphoma monitoring locally   Referring Provider:  Ramesh Cullen MD  Primary Care Provider: Ashlyn Alcazar MD  Family History of Cancer/Hematologic Disorders: Father and brother with prostate cancer  Presenting Symptoms: originally none-incidental findings    History of Present Illness:  Mr. Flores is a 78 y.o. male who presents today for follow up regarding lung mass, but actually here to establish local care for [place] surveillance.  The past medical history is significant for heart abnormality, HLD, and neuroendocrine carcinoma of lung.  Sxhx: cataract removal, left lobectomy, hand and tonsillectomy.     Mr. Flores has a history of stage IIB/F0oW5Z5 neuroendocrine lung cancer from 2018.  This was originally discovered incidentally by a calcium scoring CT scan of chest that reported a 3 cm lung mass in the left lower lobe.  He had a bronchoscopy by Dr Ronnie Escamilla in 6/2018 in which pathology reported as a neuroendocrine tumor.  He saw Dr Echevarria for surgery consult and the recommendation was for left lower lobectomy.  He went to the [facility] for second opinion.  On 8/1/2018 he underwent a robotic assisted thoracoscopy left lower lobectomy with mediastinal lymphadenectomy by Dr Ruiz at the [facility].  Final pathology reported a typical carcinoid with a single intrapulmonary lymph node positive for metastatic tumor.  No systemic chemotherapy was recommended and he was to maintain on observation with imaging.  November 2018 PET scan showed possible lytic lesion on rib and avid uptake of the prostate.  He underwent a CT scan to evaluate the rib lesion which reported no concern.  He then underwent a biopsy of his prostate on 12/5/2018 by Dr Moore at [facility], which was benign.  He remained on imaging surveillance with

## 2024-02-06 ENCOUNTER — OFFICE VISIT (OUTPATIENT)
Dept: ONCOLOGY | Age: 79
End: 2024-02-06
Payer: MEDICARE

## 2024-02-06 ENCOUNTER — HOSPITAL ENCOUNTER (OUTPATIENT)
Dept: LAB | Age: 79
Discharge: HOME | End: 2024-02-09
Payer: MEDICARE

## 2024-02-06 VITALS
TEMPERATURE: 99.2 F | RESPIRATION RATE: 16 BRPM | HEIGHT: 65 IN | DIASTOLIC BLOOD PRESSURE: 69 MMHG | HEART RATE: 82 BPM | WEIGHT: 148 LBS | OXYGEN SATURATION: 99 % | BODY MASS INDEX: 24.66 KG/M2 | SYSTOLIC BLOOD PRESSURE: 122 MMHG

## 2024-02-06 DIAGNOSIS — D64.9 ANEMIA, UNSPECIFIED TYPE: Primary | ICD-10-CM

## 2024-02-06 DIAGNOSIS — C82.98 FOLLICULAR LYMPHOMA OF LYMPH NODES OF MULTIPLE REGIONS, UNSPECIFIED FOLLICULAR LYMPHOMA TYPE (HCC): ICD-10-CM

## 2024-02-06 LAB
25(OH)D3 SERPL-MCNC: 30 NG/ML (ref 30–100)
ALBUMIN SERPL-MCNC: 3.8 G/DL (ref 3.2–4.6)
ALBUMIN SERPL-MCNC: 4 G/DL (ref 3.2–4.6)
ALBUMIN/GLOB SERPL: 1.3 (ref 0.4–1.6)
ALBUMIN/GLOB SERPL: 1.6 (ref 0.4–1.6)
ALP SERPL-CCNC: 74 U/L (ref 50–136)
ALP SERPL-CCNC: 82 U/L (ref 50–136)
ALT SERPL-CCNC: 19 U/L (ref 12–65)
ALT SERPL-CCNC: 20 U/L (ref 12–65)
ANION GAP SERPL CALC-SCNC: 0 MMOL/L (ref 2–11)
ANION GAP SERPL CALC-SCNC: 2 MMOL/L (ref 2–11)
AST SERPL-CCNC: 27 U/L (ref 15–37)
AST SERPL-CCNC: 27 U/L (ref 15–37)
BASOPHILS # BLD: 0 K/UL (ref 0–0.2)
BASOPHILS NFR BLD: 1 % (ref 0–2)
BILIRUB SERPL-MCNC: 0.4 MG/DL (ref 0.2–1.1)
BILIRUB SERPL-MCNC: 0.5 MG/DL (ref 0.2–1.1)
BUN SERPL-MCNC: 14 MG/DL (ref 8–23)
BUN SERPL-MCNC: 15 MG/DL (ref 8–23)
CALCIUM SERPL-MCNC: 8.9 MG/DL (ref 8.3–10.4)
CALCIUM SERPL-MCNC: 9.2 MG/DL (ref 8.3–10.4)
CHLORIDE SERPL-SCNC: 106 MMOL/L (ref 103–113)
CHLORIDE SERPL-SCNC: 110 MMOL/L (ref 103–113)
CHOLEST SERPL-MCNC: 132 MG/DL
CO2 SERPL-SCNC: 29 MMOL/L (ref 21–32)
CO2 SERPL-SCNC: 31 MMOL/L (ref 21–32)
CREAT SERPL-MCNC: 0.9 MG/DL (ref 0.8–1.5)
CREAT SERPL-MCNC: 0.9 MG/DL (ref 0.8–1.5)
CRP SERPL HS-MCNC: 0.5 MG/L (ref 0–3)
DIFFERENTIAL METHOD BLD: ABNORMAL
EOSINOPHIL # BLD: 0.1 K/UL (ref 0–0.8)
EOSINOPHIL NFR BLD: 2 % (ref 0.5–7.8)
ERYTHROCYTE [DISTWIDTH] IN BLOOD BY AUTOMATED COUNT: 12.6 % (ref 11.9–14.6)
ERYTHROCYTE [DISTWIDTH] IN BLOOD BY AUTOMATED COUNT: 12.7 % (ref 11.9–14.6)
GLOBULIN SER CALC-MCNC: 2.5 G/DL (ref 2.8–4.5)
GLOBULIN SER CALC-MCNC: 2.9 G/DL (ref 2.8–4.5)
GLUCOSE SERPL-MCNC: 97 MG/DL (ref 65–100)
GLUCOSE SERPL-MCNC: 99 MG/DL (ref 65–100)
HCT VFR BLD AUTO: 35.8 % (ref 41.1–50.3)
HCT VFR BLD AUTO: 38.1 % (ref 41.1–50.3)
HDLC SERPL-MCNC: 38 MG/DL (ref 40–60)
HDLC SERPL: 3.5
HGB BLD-MCNC: 12.5 G/DL (ref 13.6–17.2)
HGB BLD-MCNC: 12.6 G/DL (ref 13.6–17.2)
IMM GRANULOCYTES # BLD AUTO: 0 K/UL (ref 0–0.5)
IMM GRANULOCYTES NFR BLD AUTO: 1 % (ref 0–5)
LDH SERPL L TO P-CCNC: 204 U/L (ref 110–210)
LDLC SERPL CALC-MCNC: 73.6 MG/DL
LYMPHOCYTES # BLD: 0.2 K/UL (ref 0.5–4.6)
LYMPHOCYTES NFR BLD: 4 % (ref 13–44)
MAGNESIUM SERPL-MCNC: 2.1 MG/DL (ref 1.8–2.4)
MCH RBC QN AUTO: 32.4 PG (ref 26.1–32.9)
MCH RBC QN AUTO: 33.1 PG (ref 26.1–32.9)
MCHC RBC AUTO-ENTMCNC: 33.1 G/DL (ref 31.4–35)
MCHC RBC AUTO-ENTMCNC: 34.9 G/DL (ref 31.4–35)
MCV RBC AUTO: 94.7 FL (ref 82–102)
MCV RBC AUTO: 97.9 FL (ref 82–102)
MONOCYTES # BLD: 0.4 K/UL (ref 0.1–1.3)
MONOCYTES NFR BLD: 10 % (ref 4–12)
NEUTS SEG # BLD: 3 K/UL (ref 1.7–8.2)
NEUTS SEG NFR BLD: 82 % (ref 43–78)
NRBC # BLD: 0 K/UL (ref 0–0.2)
NRBC # BLD: 0 K/UL (ref 0–0.2)
PLATELET # BLD AUTO: 77 K/UL (ref 150–450)
PLATELET # BLD AUTO: 85 K/UL (ref 150–450)
PMV BLD AUTO: 10.4 FL (ref 9.4–12.3)
PMV BLD AUTO: 9.9 FL (ref 9.4–12.3)
POTASSIUM SERPL-SCNC: 4 MMOL/L (ref 3.5–5.1)
POTASSIUM SERPL-SCNC: 4.5 MMOL/L (ref 3.5–5.1)
PROT SERPL-MCNC: 6.5 G/DL (ref 6.3–8.2)
PROT SERPL-MCNC: 6.7 G/DL (ref 6.3–8.2)
RBC # BLD AUTO: 3.78 M/UL (ref 4.23–5.6)
RBC # BLD AUTO: 3.89 M/UL (ref 4.23–5.6)
SODIUM SERPL-SCNC: 137 MMOL/L (ref 136–146)
SODIUM SERPL-SCNC: 141 MMOL/L (ref 136–146)
T4 FREE SERPL-MCNC: 0.9 NG/DL (ref 0.78–1.46)
TRIGL SERPL-MCNC: 102 MG/DL (ref 35–150)
TSH, 3RD GENERATION: 1.71 UIU/ML (ref 0.36–3.74)
URATE SERPL-MCNC: 3.9 MG/DL (ref 2.6–6)
VIT B12 SERPL-MCNC: 777 PG/ML (ref 193–986)
VLDLC SERPL CALC-MCNC: 20.4 MG/DL (ref 6–23)
WBC # BLD AUTO: 3.5 K/UL (ref 4.3–11.1)
WBC # BLD AUTO: 3.6 K/UL (ref 4.3–11.1)

## 2024-02-06 PROCEDURE — 83615 LACTATE (LD) (LDH) ENZYME: CPT

## 2024-02-06 PROCEDURE — 85025 COMPLETE CBC W/AUTO DIFF WBC: CPT

## 2024-02-06 PROCEDURE — G8420 CALC BMI NORM PARAMETERS: HCPCS | Performed by: INTERNAL MEDICINE

## 2024-02-06 PROCEDURE — 80053 COMPREHEN METABOLIC PANEL: CPT

## 2024-02-06 PROCEDURE — 1036F TOBACCO NON-USER: CPT | Performed by: INTERNAL MEDICINE

## 2024-02-06 PROCEDURE — 99214 OFFICE O/P EST MOD 30 MIN: CPT | Performed by: INTERNAL MEDICINE

## 2024-02-06 PROCEDURE — 1123F ACP DISCUSS/DSCN MKR DOCD: CPT | Performed by: INTERNAL MEDICINE

## 2024-02-06 PROCEDURE — 36415 COLL VENOUS BLD VENIPUNCTURE: CPT

## 2024-02-06 PROCEDURE — G8484 FLU IMMUNIZE NO ADMIN: HCPCS | Performed by: INTERNAL MEDICINE

## 2024-02-06 PROCEDURE — G8427 DOCREV CUR MEDS BY ELIG CLIN: HCPCS | Performed by: INTERNAL MEDICINE

## 2024-02-06 RX ORDER — BENZONATATE 200 MG/1
200 CAPSULE ORAL
COMMUNITY
Start: 2024-02-05

## 2024-02-06 RX ORDER — PREDNISONE 10 MG/1
TABLET ORAL
COMMUNITY
Start: 2024-02-05

## 2024-02-06 NOTE — PATIENT INSTRUCTIONS
30.0 - 100.0 ng/mL Final   Orders Only on 02/06/2024   Component Date Value Ref Range Status    WBC 02/06/2024 3.5 (L)  4.3 - 11.1 K/uL Final    RESULTS CHECKED X 2    RBC 02/06/2024 3.89 (L)  4.23 - 5.6 M/uL Final    RESULTS CHECKED X 2    Hemoglobin 02/06/2024 12.6 (L)  13.6 - 17.2 g/dL Final    RESULTS CHECKED X 2    Hematocrit 02/06/2024 38.1 (L)  41.1 - 50.3 % Final    RESULTS CHECKED X 2    MCV 02/06/2024 97.9  82 - 102 FL Final    RESULTS CHECKED X 2    MCH 02/06/2024 32.4  26.1 - 32.9 PG Final    RESULTS CHECKED X 2    MCHC 02/06/2024 33.1  31.4 - 35.0 g/dL Final    RESULTS CHECKED X 2    RDW 02/06/2024 12.7  11.9 - 14.6 % Final    RESULTS CHECKED X 2    Platelets 02/06/2024 85 (L)  150 - 450 K/uL Final    RESULTS CHECKED X 2    MPV 02/06/2024 10.4  9.4 - 12.3 FL Final    RESULTS CHECKED X 2    nRBC 02/06/2024 0.00  0.0 - 0.2 K/uL Final    Comment: **Note: Absolute NRBC parameter is now reported with Hemogram**  RESULTS CHECKED X 2     There may be more visits with results that are not included.         Treatment Summary has been discussed and given to patient: n/a        -------------------------------------------------------------------------------------------------------------------  Please call our office at [phone] if you have any  of the following symptoms:   Fever of 100.5 or greater  Chills  Shortness of breath  Swelling or pain in one leg    After office hours an answering service is available and will contact a provider for emergencies or if you are experiencing any of the above symptoms.    Patient did express an interest in My Chart.  My Chart log in information explained on the after visit summary printout at the check-out desk.    SERGIO SIMMONS RN

## 2024-02-07 LAB
EST. AVERAGE GLUCOSE BLD GHB EST-MCNC: 97 MG/DL
HBA1C MFR BLD: 5 % (ref 4.8–5.6)

## 2024-02-08 ENCOUNTER — TELEPHONE (OUTPATIENT)
Dept: ONCOLOGY | Age: 79
End: 2024-02-08

## 2024-02-08 ENCOUNTER — PATIENT MESSAGE (OUTPATIENT)
Dept: ONCOLOGY | Age: 79
End: 2024-02-08

## 2024-02-08 ENCOUNTER — TELEPHONE (OUTPATIENT)
Dept: PULMONOLOGY | Age: 79
End: 2024-02-08

## 2024-02-08 NOTE — TELEPHONE ENCOUNTER
Dr Parra,  Patient is requesting this office continue following through [employer] message.  Would you please advise if you will allow scheduling per you? LOV 2/14/2023 with Dr Parra.

## 2024-02-08 NOTE — TELEPHONE ENCOUNTER
Yes, I saw him in December and the plan at that time was for pentamidine 12/29 and 2/28. Let me know if there is something else we need to do.     Jay Parra MD

## 2024-02-08 NOTE — TELEPHONE ENCOUNTER
Call to pt to come in tomorrow 2/9 for recheck of plts before GI procedure next week per Dr. Dumont's conversation with Dr. Espana, physician performing procedure.  Let pt know to call Dr. Espana's office if his platelets are less than 50 to let them know as they might want to reschedule the procedure.  Pt VU and appreciated the call back.

## 2024-02-09 ENCOUNTER — HOSPITAL ENCOUNTER (OUTPATIENT)
Dept: LAB | Age: 79
End: 2024-02-09
Payer: MEDICARE

## 2024-02-09 DIAGNOSIS — D64.9 ANEMIA, UNSPECIFIED TYPE: ICD-10-CM

## 2024-02-09 LAB
ERYTHROCYTE [DISTWIDTH] IN BLOOD BY AUTOMATED COUNT: 12.6 % (ref 11.9–14.6)
HCT VFR BLD AUTO: 36.4 % (ref 41.1–50.3)
HGB BLD-MCNC: 12.3 G/DL (ref 13.6–17.2)
MCH RBC QN AUTO: 32.8 PG (ref 26.1–32.9)
MCHC RBC AUTO-ENTMCNC: 33.8 G/DL (ref 31.4–35)
MCV RBC AUTO: 97.1 FL (ref 82–102)
NRBC # BLD: 0 K/UL (ref 0–0.2)
PLATELET # BLD AUTO: 96 K/UL (ref 150–450)
PMV BLD AUTO: 9.9 FL (ref 9.4–12.3)
RBC # BLD AUTO: 3.75 M/UL (ref 4.23–5.6)
WBC # BLD AUTO: 6.4 K/UL (ref 4.3–11.1)

## 2024-02-09 PROCEDURE — 85027 COMPLETE CBC AUTOMATED: CPT

## 2024-02-09 PROCEDURE — 36415 COLL VENOUS BLD VENIPUNCTURE: CPT

## 2024-02-13 DIAGNOSIS — C82.90 FOLLICULAR LYMPHOMA, UNSPECIFIED FOLLICULAR LYMPHOMA TYPE, UNSPECIFIED BODY REGION (HCC): Primary | ICD-10-CM

## 2024-02-13 RX ORDER — ALBUTEROL SULFATE 2.5 MG/3ML
2.5 SOLUTION RESPIRATORY (INHALATION) ONCE
Qty: 1 EACH | Refills: 0
Start: 2024-04-29 | End: 2024-02-13

## 2024-02-13 RX ORDER — ALBUTEROL SULFATE 2.5 MG/3ML
2.5 SOLUTION RESPIRATORY (INHALATION) ONCE
Qty: 1 EACH | Refills: 0 | Status: SHIPPED | OUTPATIENT
Start: 2024-04-29 | End: 2024-04-29

## 2024-02-13 RX ORDER — ALBUTEROL SULFATE 2.5 MG/3ML
2.5 SOLUTION RESPIRATORY (INHALATION) ONCE
Qty: 1 EACH | Refills: 0
Start: 2024-03-28 | End: 2024-03-28

## 2024-02-13 NOTE — TELEPHONE ENCOUNTER
I have been granted approval from Dr Parra to schedule additional Pentamidine treatments in [place] until patient returns to [place]. I have spoken with patient who needs Pentamidine in March 2024 and April 2024.  Will return to [place] in May 2024. He is scheduled on March 28 at 2 pm and April 29 at 2 pm after speaking with Priscilla Kim RRT/RCP.  Times/dates are sent to patient in [facility].

## 2024-02-28 ENCOUNTER — HOSPITAL ENCOUNTER (OUTPATIENT)
Dept: PULMONOLOGY | Age: 79
Discharge: HOME | End: 2024-03-02
Payer: MEDICARE

## 2024-02-28 VITALS — RESPIRATION RATE: 15 BRPM | OXYGEN SATURATION: 98 % | HEART RATE: 78 BPM

## 2024-02-28 DIAGNOSIS — C82.90 FOLLICULAR LYMPHOMA, UNSPECIFIED FOLLICULAR LYMPHOMA TYPE, UNSPECIFIED BODY REGION (HCC): ICD-10-CM

## 2024-02-28 PROCEDURE — 94642 AEROSOL INHALATION TREATMENT: CPT

## 2024-02-28 PROCEDURE — 94640 AIRWAY INHALATION TREATMENT: CPT

## 2024-03-25 ENCOUNTER — OFFICE VISIT (OUTPATIENT)
Dept: PULMONOLOGY | Age: 79
End: 2024-03-25
Payer: MEDICARE

## 2024-03-25 VITALS
HEART RATE: 84 BPM | TEMPERATURE: 97.8 F | HEIGHT: 66 IN | DIASTOLIC BLOOD PRESSURE: 60 MMHG | BODY MASS INDEX: 24.11 KG/M2 | WEIGHT: 150 LBS | OXYGEN SATURATION: 98 % | SYSTOLIC BLOOD PRESSURE: 128 MMHG | RESPIRATION RATE: 18 BRPM

## 2024-03-25 DIAGNOSIS — Z90.2 S/P LOBECTOMY OF LUNG: ICD-10-CM

## 2024-03-25 DIAGNOSIS — C82.90 FOLLICULAR LYMPHOMA, UNSPECIFIED FOLLICULAR LYMPHOMA TYPE, UNSPECIFIED BODY REGION (HCC): Primary | ICD-10-CM

## 2024-03-25 DIAGNOSIS — Z85.9 HISTORY OF MALIGNANT CARCINOID TUMOR: ICD-10-CM

## 2024-03-25 DIAGNOSIS — C49.9 LIPOSARCOMA (HCC): ICD-10-CM

## 2024-03-25 DIAGNOSIS — B59 PNEUMONIA DUE TO PNEUMOCYSTIS JIROVECII, UNSPECIFIED LATERALITY, UNSPECIFIED PART OF LUNG (HCC): ICD-10-CM

## 2024-03-25 PROCEDURE — 1036F TOBACCO NON-USER: CPT | Performed by: INTERNAL MEDICINE

## 2024-03-25 PROCEDURE — G8484 FLU IMMUNIZE NO ADMIN: HCPCS | Performed by: INTERNAL MEDICINE

## 2024-03-25 PROCEDURE — 1123F ACP DISCUSS/DSCN MKR DOCD: CPT | Performed by: INTERNAL MEDICINE

## 2024-03-25 PROCEDURE — 99213 OFFICE O/P EST LOW 20 MIN: CPT | Performed by: INTERNAL MEDICINE

## 2024-03-25 PROCEDURE — G8427 DOCREV CUR MEDS BY ELIG CLIN: HCPCS | Performed by: INTERNAL MEDICINE

## 2024-03-25 PROCEDURE — G8420 CALC BMI NORM PARAMETERS: HCPCS | Performed by: INTERNAL MEDICINE

## 2024-03-25 NOTE — PROGRESS NOTES
Name:  Gume Flores  YOB: 1945   MRN: [id number]      Office Visit: 3/25/2024     HISTORY OF PRESENT ILLNESS:    Mr. Gume Flores is a 78 y.o. 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   Enlarging mesenteric lymph nodes. Diagnosed with follicular lymphoma.     His last appt was Oct 2022. At that time he was clinically doing well.     Now he is here to start pentamidine neb treatments.   He states he started rituxan and bendamustine in July through [facility]. Finished in November. He started monthly pentamidine nebs during the therapy. He is sulfa allergic but he is not sure why they did not choose any of the alternative prophylaxis regimens.     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     He is needing to have this done 12/29/23 and 2/28/24. He is to have his January treatment at [facility].     He denies any new pulmonary symptoms.     ASSESSMENT AND PLAN:  (Medical Decision Making)    Impression: 78 y.o. male with complicated cancer history, initially atypical pulmonary carcinoid resected in 2018, then follicular lymphoma, then liposarcoma May 2023.   Undergoing chemo for lymphoma with [facility]. Was started on PJP prophylaxis with monthly pentamidine nebs in July 2023. Asked to continue this for him in December and February.     -working with hospital PFT lab to schedule albuterol 2.5 neb and pentamidine neb 300mg for Dec 29 and Feb 28.   -cont radiographic monitoring of recurrence of carcinoid.   -will plan to follow up with 3 month office appt unless he needs us before then.       1. Follicular lymphoma, unspecified follicular lymphoma type, unspecified body region (HCC)    - albuterol (PROVENTIL) (2.5 MG/3ML) 0.083% nebulizer solution 2.5 mg  - albuterol (PROVENTIL) (2.5 MG/3ML) 0.083% nebulizer

## 2024-03-28 ENCOUNTER — HOSPITAL ENCOUNTER (OUTPATIENT)
Dept: PULMONOLOGY | Age: 79
Discharge: HOME | End: 2024-03-28
Payer: MEDICARE

## 2024-03-28 VITALS — OXYGEN SATURATION: 99 %

## 2024-03-28 PROCEDURE — 94640 AIRWAY INHALATION TREATMENT: CPT

## 2024-03-28 PROCEDURE — 94642 AEROSOL INHALATION TREATMENT: CPT

## 2024-04-29 ENCOUNTER — HOSPITAL ENCOUNTER (OUTPATIENT)
Dept: PULMONOLOGY | Age: 79
Discharge: HOME | End: 2024-05-02
Payer: MEDICARE

## 2024-04-29 VITALS — HEART RATE: 82 BPM | OXYGEN SATURATION: 97 % | RESPIRATION RATE: 16 BRPM

## 2024-04-29 DIAGNOSIS — C82.90 FOLLICULAR LYMPHOMA, UNSPECIFIED FOLLICULAR LYMPHOMA TYPE, UNSPECIFIED BODY REGION (HCC): ICD-10-CM

## 2024-04-29 PROCEDURE — 94640 AIRWAY INHALATION TREATMENT: CPT

## 2024-04-29 PROCEDURE — 94642 AEROSOL INHALATION TREATMENT: CPT

## 2024-04-29 PROCEDURE — 94644 CONT INHLJ TX 1ST HOUR: CPT

## 2024-04-29 RX ORDER — PENTAMIDINE ISETHIONATE 300 MG/300MG
300 INHALANT RESPIRATORY (INHALATION) ONCE
Status: DISCONTINUED | OUTPATIENT
Start: 2024-04-29 | End: 2024-05-03 | Stop reason: HOSPADM

## 2024-05-07 ENCOUNTER — NURSE ONLY (OUTPATIENT)
Dept: UROLOGY | Age: 79
End: 2024-05-07

## 2024-05-07 DIAGNOSIS — R97.20 ELEVATED PSA: ICD-10-CM

## 2024-05-07 LAB
PSA FREE MFR SERPL: 27.7 %
PSA FREE SERPL-MCNC: 0.4 NG/ML
PSA SERPL-MCNC: 1.3 NG/ML (ref 0–4)

## 2024-05-14 ENCOUNTER — OFFICE VISIT (OUTPATIENT)
Dept: UROLOGY | Age: 79
End: 2024-05-14
Payer: MEDICARE

## 2024-05-14 DIAGNOSIS — N52.9 ERECTILE DYSFUNCTION, UNSPECIFIED ERECTILE DYSFUNCTION TYPE: ICD-10-CM

## 2024-05-14 DIAGNOSIS — R97.20 ELEVATED PSA: Primary | ICD-10-CM

## 2024-05-14 DIAGNOSIS — N40.1 BENIGN PROSTATIC HYPERPLASIA WITH LOWER URINARY TRACT SYMPTOMS, SYMPTOM DETAILS UNSPECIFIED: ICD-10-CM

## 2024-05-14 LAB
BILIRUBIN, URINE, POC: NEGATIVE
BLOOD URINE, POC: NORMAL
GLUCOSE URINE, POC: NEGATIVE
KETONES, URINE, POC: NEGATIVE
LEUKOCYTE ESTERASE, URINE, POC: NEGATIVE
NITRITE, URINE, POC: NEGATIVE
PH, URINE, POC: 7 (ref 4.6–8)
PROTEIN,URINE, POC: NEGATIVE
SPECIFIC GRAVITY, URINE, POC: 1.01 (ref 1–1.03)
URINALYSIS CLARITY, POC: NORMAL
URINALYSIS COLOR, POC: NORMAL
UROBILINOGEN, POC: NORMAL

## 2024-05-14 PROCEDURE — 99214 OFFICE O/P EST MOD 30 MIN: CPT | Performed by: UROLOGY

## 2024-05-14 PROCEDURE — G8420 CALC BMI NORM PARAMETERS: HCPCS | Performed by: UROLOGY

## 2024-05-14 PROCEDURE — 81003 URINALYSIS AUTO W/O SCOPE: CPT | Performed by: UROLOGY

## 2024-05-14 PROCEDURE — G8427 DOCREV CUR MEDS BY ELIG CLIN: HCPCS | Performed by: UROLOGY

## 2024-05-14 PROCEDURE — 1036F TOBACCO NON-USER: CPT | Performed by: UROLOGY

## 2024-05-14 PROCEDURE — 1123F ACP DISCUSS/DSCN MKR DOCD: CPT | Performed by: UROLOGY

## 2024-05-14 RX ORDER — TADALAFIL 5 MG/1
5 TABLET ORAL DAILY
Qty: 90 TABLET | Refills: 3 | Status: SHIPPED | OUTPATIENT
Start: 2024-05-14

## 2024-05-14 RX ORDER — ALFUZOSIN HYDROCHLORIDE 10 MG/1
10 TABLET, EXTENDED RELEASE ORAL DAILY
Qty: 90 TABLET | Refills: 3 | Status: SHIPPED | OUTPATIENT
Start: 2024-05-14

## 2024-05-14 RX ORDER — DUTASTERIDE 0.5 MG/1
0.5 CAPSULE, LIQUID FILLED ORAL DAILY
Qty: 90 CAPSULE | Refills: 3 | Status: SHIPPED | OUTPATIENT
Start: 2024-05-14

## 2024-05-14 ASSESSMENT — ENCOUNTER SYMPTOMS: BACK PAIN: 0

## 2024-05-14 NOTE — PROGRESS NOTES
W/O MICRO   Result Value Ref Range    Color, Urine, POC      Clarity, Urine, POC      Glucose, Urine, POC Negative Negative    Bilirubin, Urine, POC Negative Negative    Ketones, Urine, POC Negative Negative    Specific Gravity, Urine, POC 1.015 1.001 - 1.035    Blood, Urine, POC neg Negative    pH, Urine, POC 7.0 4.6 - 8.0    Protein, Urine, POC Negative Negative    Urobilinogen, POC 0.2     Nitrite, Urine, POC neg Negative    Leukocyte Esterase, Urine, POC Negative Negative     There were no vitals taken for this visit.     GENERAL: NAD, ALERT, A&O x 3, GAIT NORMAL  LUNGS: easy work of breathing  ABDOMEN: soft, non tender  NEUROLOGIC: cranial nerves 2-12 grossly intact     Assessment and Plan    ICD-10-CM    1. Elevated PSA  R97.20 AMB POC URINALYSIS DIP STICK AUTO W/O MICRO     PSA, Diagnostic      2. Benign prostatic hyperplasia with lower urinary tract symptoms, symptom details unspecified  N40.1 AMB POC URINALYSIS DIP STICK AUTO W/O MICRO     alfuzosin (UROXATRAL) 10 MG extended release tablet     dutasteride (AVODART) 0.5 MG capsule     tadalafil (CIALIS) 5 MG tablet      3. Erectile dysfunction, unspecified erectile dysfunction type  N52.9 AMB POC URINALYSIS DIP STICK AUTO W/O MICRO     tadalafil (CIALIS) 5 MG tablet          PSA is low with a nice high free %age.  Pt. will follow up in 12 months with psa for MAME.     He will contimnue alfuzosin/dutasteride/tadalafil.     I have spent 30 minutes today reviewing previous notes, test results and face to face with the patient as well as documenting.

## 2024-05-24 DIAGNOSIS — C82.98 FOLLICULAR LYMPHOMA OF LYMPH NODES OF MULTIPLE REGIONS, UNSPECIFIED FOLLICULAR LYMPHOMA TYPE (HCC): Primary | ICD-10-CM

## 2024-05-24 DIAGNOSIS — D64.9 ANEMIA, UNSPECIFIED TYPE: ICD-10-CM

## 2024-05-24 NOTE — PROGRESS NOTES
Monthly CBC draw per Wesley and ok per Dr Dumont.   Schedluer to set up lab only for June and July. Follow up in August with Dr Dumont w labs same day.

## 2024-05-28 ENCOUNTER — TELEPHONE (OUTPATIENT)
Dept: PULMONOLOGY | Age: 79
End: 2024-05-28

## 2024-05-28 NOTE — TELEPHONE ENCOUNTER
Patient says that the [facility] hemotologist where he has respiratory lung treatments says that he should do 1 treatment a month and coordinate with blood draws. The request has been sent in Dr. Ronnie BLANDON portal there is a letter for you .

## 2024-05-28 NOTE — TELEPHONE ENCOUNTER
Spoke directly with Dr Traylor who approves scheduling Pentamidine treatments monthly at [facility]. Patient contacted and we have scheduled both 5/30 and 6/27 with permission of LEILANI Kim RRT.  Mr Flores will call back if additional treatments need to be scheduled.

## 2024-05-30 ENCOUNTER — HOSPITAL ENCOUNTER (OUTPATIENT)
Dept: PULMONOLOGY | Age: 79
Discharge: HOME | End: 2024-05-30
Payer: MEDICARE

## 2024-05-30 VITALS — HEART RATE: 95 BPM | OXYGEN SATURATION: 99 % | RESPIRATION RATE: 16 BRPM

## 2024-05-30 PROCEDURE — 94640 AIRWAY INHALATION TREATMENT: CPT

## 2024-05-30 PROCEDURE — 94642 AEROSOL INHALATION TREATMENT: CPT

## 2024-06-12 ENCOUNTER — APPOINTMENT (RX ONLY)
Dept: URBAN - METROPOLITAN AREA CLINIC 25 | Facility: CLINIC | Age: 79
Setting detail: DERMATOLOGY
End: 2024-06-12

## 2024-06-12 DIAGNOSIS — Z00.00 ENCOUNTER FOR GENERAL ADULT MEDICAL EXAMINATION WITHOUT ABNORMAL FINDINGS: ICD-10-CM

## 2024-06-12 DIAGNOSIS — D485 NEOPLASM OF UNCERTAIN BEHAVIOR OF SKIN: ICD-10-CM

## 2024-06-12 PROBLEM — D48.5 NEOPLASM OF UNCERTAIN BEHAVIOR OF SKIN: Status: ACTIVE | Noted: 2024-06-12

## 2024-06-12 PROCEDURE — ? ADDITIONAL NOTES

## 2024-06-12 PROCEDURE — ? OBSERVATION

## 2024-06-12 PROCEDURE — ? COUNSELING

## 2024-06-12 PROCEDURE — 99212 OFFICE O/P EST SF 10 MIN: CPT

## 2024-06-12 ASSESSMENT — LOCATION DETAILED DESCRIPTION DERM
LOCATION DETAILED: LEFT ANTERIOR DISTAL UPPER ARM
LOCATION DETAILED: NASAL DORSUM

## 2024-06-12 ASSESSMENT — LOCATION ZONE DERM
LOCATION ZONE: ARM
LOCATION ZONE: NOSE

## 2024-06-12 ASSESSMENT — LOCATION SIMPLE DESCRIPTION DERM
LOCATION SIMPLE: NOSE
LOCATION SIMPLE: LEFT UPPER ARM

## 2024-06-12 NOTE — PROCEDURE: ADDITIONAL NOTES
Detail Level: Simple
Additional Notes: Patient reports a recurring lesion on the nose that was previously biopsied by an outside dermatologist many years ago, patient will get records\\nArea goes away when treated with Hydrocortisone cream\\nArea not appreciated on exam today. Await records for further clarification.
Render Risk Assessment In Note?: no

## 2024-06-17 ENCOUNTER — HOSPITAL ENCOUNTER (OUTPATIENT)
Dept: LAB | Age: 79
Discharge: HOME | End: 2024-06-20
Payer: MEDICARE

## 2024-06-17 DIAGNOSIS — D64.9 ANEMIA, UNSPECIFIED TYPE: ICD-10-CM

## 2024-06-17 DIAGNOSIS — C82.98 FOLLICULAR LYMPHOMA OF LYMPH NODES OF MULTIPLE REGIONS, UNSPECIFIED FOLLICULAR LYMPHOMA TYPE (HCC): ICD-10-CM

## 2024-06-17 LAB
BASOPHILS # BLD: 0 K/UL (ref 0–0.2)
BASOPHILS NFR BLD: 0 % (ref 0–2)
DIFFERENTIAL METHOD BLD: ABNORMAL
EOSINOPHIL # BLD: 0.1 K/UL (ref 0–0.8)
EOSINOPHIL NFR BLD: 1 % (ref 0.5–7.8)
ERYTHROCYTE [DISTWIDTH] IN BLOOD BY AUTOMATED COUNT: 13 % (ref 11.9–14.6)
HCT VFR BLD AUTO: 37 % (ref 41.1–50.3)
HGB BLD-MCNC: 12.4 G/DL (ref 13.6–17.2)
IMM GRANULOCYTES # BLD AUTO: 0 K/UL (ref 0–0.5)
IMM GRANULOCYTES NFR BLD AUTO: 1 % (ref 0–5)
LYMPHOCYTES # BLD: 0.4 K/UL (ref 0.5–4.6)
LYMPHOCYTES NFR BLD: 7 % (ref 13–44)
MCH RBC QN AUTO: 32.6 PG (ref 26.1–32.9)
MCHC RBC AUTO-ENTMCNC: 33.5 G/DL (ref 31.4–35)
MCV RBC AUTO: 97.4 FL (ref 82–102)
MONOCYTES # BLD: 0.5 K/UL (ref 0.1–1.3)
MONOCYTES NFR BLD: 9 % (ref 4–12)
NEUTS SEG # BLD: 4.5 K/UL (ref 1.7–8.2)
NEUTS SEG NFR BLD: 82 % (ref 43–78)
NRBC # BLD: 0 K/UL (ref 0–0.2)
PLATELET # BLD AUTO: 110 K/UL (ref 150–450)
PMV BLD AUTO: 10 FL (ref 9.4–12.3)
RBC # BLD AUTO: 3.8 M/UL (ref 4.23–5.6)
WBC # BLD AUTO: 5.5 K/UL (ref 4.3–11.1)

## 2024-06-17 PROCEDURE — 85025 COMPLETE CBC W/AUTO DIFF WBC: CPT

## 2024-06-17 PROCEDURE — 36415 COLL VENOUS BLD VENIPUNCTURE: CPT

## 2024-06-27 ENCOUNTER — HOSPITAL ENCOUNTER (OUTPATIENT)
Dept: PULMONOLOGY | Age: 79
Discharge: HOME | End: 2024-06-27
Payer: MEDICARE

## 2024-06-27 VITALS — HEART RATE: 80 BPM | OXYGEN SATURATION: 99 % | RESPIRATION RATE: 16 BRPM

## 2024-06-27 PROCEDURE — 94642 AEROSOL INHALATION TREATMENT: CPT

## 2024-06-27 PROCEDURE — 94640 AIRWAY INHALATION TREATMENT: CPT

## 2024-07-12 ENCOUNTER — HOSPITAL ENCOUNTER (OUTPATIENT)
Dept: PULMONOLOGY | Age: 79
End: 2024-07-12
Payer: MEDICARE

## 2024-07-12 ENCOUNTER — HOSPITAL ENCOUNTER (OUTPATIENT)
Dept: LAB | Age: 79
End: 2024-07-12
Payer: MEDICARE

## 2024-07-12 DIAGNOSIS — D64.9 ANEMIA, UNSPECIFIED TYPE: ICD-10-CM

## 2024-07-12 DIAGNOSIS — C82.98 FOLLICULAR LYMPHOMA OF LYMPH NODES OF MULTIPLE REGIONS, UNSPECIFIED FOLLICULAR LYMPHOMA TYPE (HCC): ICD-10-CM

## 2024-07-12 LAB
BASOPHILS # BLD: 0 K/UL (ref 0–0.2)
BASOPHILS NFR BLD: 0 % (ref 0–2)
DIFFERENTIAL METHOD BLD: ABNORMAL
EOSINOPHIL # BLD: 0.1 K/UL (ref 0–0.8)
EOSINOPHIL NFR BLD: 1 % (ref 0.5–7.8)
ERYTHROCYTE [DISTWIDTH] IN BLOOD BY AUTOMATED COUNT: 12.8 % (ref 11.9–14.6)
HCT VFR BLD AUTO: 36.5 % (ref 41.1–50.3)
HGB BLD-MCNC: 12.5 G/DL (ref 13.6–17.2)
IMM GRANULOCYTES # BLD AUTO: 0 K/UL (ref 0–0.5)
IMM GRANULOCYTES NFR BLD AUTO: 0 % (ref 0–5)
LYMPHOCYTES # BLD: 0.4 K/UL (ref 0.5–4.6)
LYMPHOCYTES NFR BLD: 9 % (ref 13–44)
MCH RBC QN AUTO: 32.8 PG (ref 26.1–32.9)
MCHC RBC AUTO-ENTMCNC: 34.2 G/DL (ref 31.4–35)
MCV RBC AUTO: 95.8 FL (ref 82–102)
MONOCYTES # BLD: 0.3 K/UL (ref 0.1–1.3)
MONOCYTES NFR BLD: 6 % (ref 4–12)
NEUTS SEG # BLD: 3.7 K/UL (ref 1.7–8.2)
NEUTS SEG NFR BLD: 84 % (ref 43–78)
NRBC # BLD: 0 K/UL (ref 0–0.2)
PLATELET # BLD AUTO: 106 K/UL (ref 150–450)
PMV BLD AUTO: 9.2 FL (ref 9.4–12.3)
RBC # BLD AUTO: 3.81 M/UL (ref 4.23–5.6)
WBC # BLD AUTO: 4.5 K/UL (ref 4.3–11.1)

## 2024-07-12 PROCEDURE — 36415 COLL VENOUS BLD VENIPUNCTURE: CPT

## 2024-07-12 PROCEDURE — 94640 AIRWAY INHALATION TREATMENT: CPT

## 2024-07-12 PROCEDURE — 85025 COMPLETE CBC W/AUTO DIFF WBC: CPT

## 2024-07-12 PROCEDURE — 94642 AEROSOL INHALATION TREATMENT: CPT

## 2024-08-15 DIAGNOSIS — C82.98 FOLLICULAR LYMPHOMA OF LYMPH NODES OF MULTIPLE REGIONS, UNSPECIFIED FOLLICULAR LYMPHOMA TYPE (HCC): Primary | ICD-10-CM

## 2024-08-26 NOTE — PROGRESS NOTES
with a single intrapulmonary lymph node positive for metastatic tumor, making his stage a [id number], stage IIB.   - currently on surveillance at [facility] - most recently saw Dr Mathur     3. Laryngeal/hypolaryngeal lesion - liposarcoma   - 6/20/2023 - 12.7 cm piriformis sinus well-differentiated liposarcoma resected    RESUSCITATION DIRECTIVES/HOSPICE CARE: Full Support    RTC per sched for labs/he also continues to FU with [facility] in the interim specifically for scans.      MDM     Amount and/or Complexity of Data Reviewed  Clinical lab tests: ordered and reviewed  Tests in the radiology section of CPT®: reviewed  Obtain history from someone other than the patient: yes  Review and summarize past medical records: yes  Independent visualization of images, tracings, or specimens: yes    Risk of Complications, Morbidity, and/or Mortality  Presenting problems: low  Diagnostic procedures: low  Management options: moderate      Lab studies and imaging studies were personally reviewed.  Pertinent old records were reviewed.     Historical:   -Discussed diet modification.   - sees Dr Espinal at [facility] - will be seen again in May 2023 at which time, he'll see Dr Posey to determine if sx is needed/indicated.    - here for FU after CT.  Ct showed progressive LAD.  He reviewed this with Dr Mathur at [facility] (virtual visit).  He communicated with Dr Sahu and plan will be to repeat imaging at [facility] in May.  Will c/w surveillance.  No B symptoms.    - Labs reviewed and stable anemia noted - checking nutritional studies.  We also reviewed typical indications for tx, he and wife VU.  They are in agreement with this plan.    -During today's consultation, we discussed the pathophysiology of hematopoiesis, as well as, follicular lymphoma.  We discussed staging, grade as well as risk.  We also reviewed indications for treatment.  He is a patient of Dr. Carson at [facility] in [place].  No current B symptoms.  - planned CT is tomorrow - anemia -

## 2024-08-30 ENCOUNTER — PATIENT MESSAGE (OUTPATIENT)
Dept: ONCOLOGY | Age: 79
End: 2024-08-30

## 2024-08-30 ENCOUNTER — HOSPITAL ENCOUNTER (OUTPATIENT)
Dept: LAB | Age: 79
End: 2024-08-30
Payer: MEDICARE

## 2024-08-30 ENCOUNTER — OFFICE VISIT (OUTPATIENT)
Dept: ONCOLOGY | Age: 79
End: 2024-08-30
Payer: MEDICARE

## 2024-08-30 VITALS
DIASTOLIC BLOOD PRESSURE: 76 MMHG | BODY MASS INDEX: 25.46 KG/M2 | SYSTOLIC BLOOD PRESSURE: 118 MMHG | HEART RATE: 71 BPM | OXYGEN SATURATION: 98 % | RESPIRATION RATE: 18 BRPM | HEIGHT: 65 IN | WEIGHT: 152.8 LBS

## 2024-08-30 DIAGNOSIS — C82.98 FOLLICULAR LYMPHOMA OF LYMPH NODES OF MULTIPLE REGIONS, UNSPECIFIED FOLLICULAR LYMPHOMA TYPE (HCC): Primary | ICD-10-CM

## 2024-08-30 DIAGNOSIS — C82.98 FOLLICULAR LYMPHOMA OF LYMPH NODES OF MULTIPLE REGIONS, UNSPECIFIED FOLLICULAR LYMPHOMA TYPE (HCC): ICD-10-CM

## 2024-08-30 DIAGNOSIS — D64.9 ANEMIA, UNSPECIFIED TYPE: ICD-10-CM

## 2024-08-30 LAB
ALBUMIN SERPL-MCNC: 4 G/DL (ref 3.2–4.6)
ALBUMIN/GLOB SERPL: 1.5 (ref 1–1.9)
ALP SERPL-CCNC: 64 U/L (ref 40–129)
ALT SERPL-CCNC: 13 U/L (ref 12–65)
ANION GAP SERPL CALC-SCNC: 8 MMOL/L (ref 9–18)
AST SERPL-CCNC: 25 U/L (ref 15–37)
BASOPHILS # BLD: 0 K/UL (ref 0–0.2)
BASOPHILS NFR BLD: 1 % (ref 0–2)
BILIRUB SERPL-MCNC: 0.6 MG/DL (ref 0–1.2)
BUN SERPL-MCNC: 23 MG/DL (ref 8–23)
CALCIUM SERPL-MCNC: 9.9 MG/DL (ref 8.8–10.2)
CHLORIDE SERPL-SCNC: 102 MMOL/L (ref 98–107)
CO2 SERPL-SCNC: 28 MMOL/L (ref 20–28)
CREAT SERPL-MCNC: 0.99 MG/DL (ref 0.8–1.3)
DIFFERENTIAL METHOD BLD: ABNORMAL
EOSINOPHIL # BLD: 0.1 K/UL (ref 0–0.8)
EOSINOPHIL NFR BLD: 2 % (ref 0.5–7.8)
ERYTHROCYTE [DISTWIDTH] IN BLOOD BY AUTOMATED COUNT: 12.4 % (ref 11.9–14.6)
FERRITIN SERPL-MCNC: 289 NG/ML (ref 8–388)
GLOBULIN SER CALC-MCNC: 2.6 G/DL (ref 2.3–3.5)
GLUCOSE SERPL-MCNC: 94 MG/DL (ref 70–99)
HCT VFR BLD AUTO: 34.9 % (ref 41.1–50.3)
HGB BLD-MCNC: 12.1 G/DL (ref 13.6–17.2)
IMM GRANULOCYTES # BLD AUTO: 0.1 K/UL (ref 0–0.5)
IMM GRANULOCYTES NFR BLD AUTO: 2 % (ref 0–5)
IRON SATN MFR SERPL: 54 % (ref 20–50)
IRON SERPL-MCNC: 152 UG/DL (ref 35–100)
LDH SERPL L TO P-CCNC: 217 U/L (ref 127–281)
LYMPHOCYTES # BLD: 0.5 K/UL (ref 0.5–4.6)
LYMPHOCYTES NFR BLD: 11 % (ref 13–44)
MCH RBC QN AUTO: 33.1 PG (ref 26.1–32.9)
MCHC RBC AUTO-ENTMCNC: 34.7 G/DL (ref 31.4–35)
MCV RBC AUTO: 95.4 FL (ref 82–102)
MONOCYTES # BLD: 0.6 K/UL (ref 0.1–1.3)
MONOCYTES NFR BLD: 12 % (ref 4–12)
NEUTS SEG # BLD: 3.5 K/UL (ref 1.7–8.2)
NEUTS SEG NFR BLD: 72 % (ref 43–78)
NRBC # BLD: 0 K/UL (ref 0–0.2)
PLATELET # BLD AUTO: 142 K/UL (ref 150–450)
PMV BLD AUTO: 8.9 FL (ref 9.4–12.3)
POTASSIUM SERPL-SCNC: 4.8 MMOL/L (ref 3.5–5.1)
PROT SERPL-MCNC: 6.6 G/DL (ref 6.3–8.2)
RBC # BLD AUTO: 3.66 M/UL (ref 4.23–5.6)
SODIUM SERPL-SCNC: 138 MMOL/L (ref 136–145)
TIBC SERPL-MCNC: 279 UG/DL (ref 240–450)
UIBC SERPL-MCNC: 127 UG/DL (ref 112–347)
WBC # BLD AUTO: 4.8 K/UL (ref 4.3–11.1)

## 2024-08-30 PROCEDURE — 1036F TOBACCO NON-USER: CPT | Performed by: INTERNAL MEDICINE

## 2024-08-30 PROCEDURE — 85025 COMPLETE CBC W/AUTO DIFF WBC: CPT

## 2024-08-30 PROCEDURE — 83540 ASSAY OF IRON: CPT

## 2024-08-30 PROCEDURE — 82728 ASSAY OF FERRITIN: CPT

## 2024-08-30 PROCEDURE — 99213 OFFICE O/P EST LOW 20 MIN: CPT | Performed by: INTERNAL MEDICINE

## 2024-08-30 PROCEDURE — 1123F ACP DISCUSS/DSCN MKR DOCD: CPT | Performed by: INTERNAL MEDICINE

## 2024-08-30 PROCEDURE — 80053 COMPREHEN METABOLIC PANEL: CPT

## 2024-08-30 PROCEDURE — 83615 LACTATE (LD) (LDH) ENZYME: CPT

## 2024-08-30 PROCEDURE — G8419 CALC BMI OUT NRM PARAM NOF/U: HCPCS | Performed by: INTERNAL MEDICINE

## 2024-08-30 PROCEDURE — G8427 DOCREV CUR MEDS BY ELIG CLIN: HCPCS | Performed by: INTERNAL MEDICINE

## 2024-08-30 PROCEDURE — 36415 COLL VENOUS BLD VENIPUNCTURE: CPT

## 2024-08-30 PROCEDURE — 83550 IRON BINDING TEST: CPT

## 2024-08-30 ASSESSMENT — PATIENT HEALTH QUESTIONNAIRE - PHQ9
2. FEELING DOWN, DEPRESSED OR HOPELESS: NOT AT ALL
SUM OF ALL RESPONSES TO PHQ QUESTIONS 1-9: 0
SUM OF ALL RESPONSES TO PHQ QUESTIONS 1-9: 0
SUM OF ALL RESPONSES TO PHQ9 QUESTIONS 1 & 2: 0
SUM OF ALL RESPONSES TO PHQ QUESTIONS 1-9: 0
SUM OF ALL RESPONSES TO PHQ QUESTIONS 1-9: 0
1. LITTLE INTEREST OR PLEASURE IN DOING THINGS: NOT AT ALL

## 2024-08-30 NOTE — PATIENT INSTRUCTIONS
Patient Information from Today's Visit    The members of your Oncology Medical Home are listed below:    Physician Provider: Emely Dumont, Medical Oncologist  Advanced Practice Clinician: Roselia Gomez NP  Registered Nurse: Chasidy OBANDO RN  Navigator: N/A  Medical Assistant: Drashana RODRIGUEZ MA  : Hoda TOURE   Supportive Care Services: Mohan MANN LMSW    Diagnosis: Lymphoma    Follow Up Instructions: 6 months.    Labs reviewed.  Symptoms reviewed.  Continue to follow up with [facility].    You will need to get labs done within 72 hours of your follow up appointment at one of our outreach lab locations.  These labs are walk in and there are no appointments needed.  We may schedule you for a placeholder appointment as a reminder to get these done before your appointment.    1)   [facility]  [address]   Phone: [phone]  Mon - Fri 730am - 430pm    2)   [facility]  [address]  Phone: [phone]  Mon - Fri 730am - 430pm    3)  [facility]  [address]  Phone: [phone]  Mon - Fri 700am - 430pm    4)  [facility]   [address], [address]  Phone: [phone]  Mon - Fri 730am - 430pm      Treatment Summary has been discussed and given to patient:N/A      Current Labs:   Hospital Outpatient Visit on 08/30/2024   Component Date Value Ref Range Status    WBC 08/30/2024 4.8  4.3 - 11.1 K/uL Final    RBC 08/30/2024 3.66 (L)  4.23 - 5.6 M/uL Final    Hemoglobin 08/30/2024 12.1 (L)  13.6 - 17.2 g/dL Final    Hematocrit 08/30/2024 34.9 (L)  41.1 - 50.3 % Final    MCV 08/30/2024 95.4  82.0 - 102.0 FL Final    MCH 08/30/2024 33.1 (H)  26.1 - 32.9 PG Final    MCHC 08/30/2024 34.7  31.4 - 35.0 g/dL Final    RDW 08/30/2024 12.4  11.9 - 14.6 % Final    Platelets 08/30/2024 142 (L)  150 - 450 K/uL Final    MPV 08/30/2024 8.9 (L)  9.4 - 12.3 FL Final    nRBC 08/30/2024 0.00  0.0 - 0.2 K/uL

## 2024-09-25 ENCOUNTER — OFFICE VISIT (OUTPATIENT)
Dept: PULMONOLOGY | Age: 79
End: 2024-09-25
Payer: MEDICARE

## 2024-09-25 VITALS
SYSTOLIC BLOOD PRESSURE: 120 MMHG | RESPIRATION RATE: 14 BRPM | DIASTOLIC BLOOD PRESSURE: 71 MMHG | HEIGHT: 66 IN | OXYGEN SATURATION: 97 % | WEIGHT: 155 LBS | BODY MASS INDEX: 24.91 KG/M2 | HEART RATE: 79 BPM

## 2024-09-25 DIAGNOSIS — C82.90 FOLLICULAR LYMPHOMA, UNSPECIFIED FOLLICULAR LYMPHOMA TYPE, UNSPECIFIED BODY REGION (HCC): Primary | ICD-10-CM

## 2024-09-25 DIAGNOSIS — Z90.2 S/P LOBECTOMY OF LUNG: ICD-10-CM

## 2024-09-25 DIAGNOSIS — C7A.090 MALIGNANT CARCINOID TUMOR OF THE BRONCHUS AND LUNG (HCC): ICD-10-CM

## 2024-09-25 PROCEDURE — 1036F TOBACCO NON-USER: CPT | Performed by: INTERNAL MEDICINE

## 2024-09-25 PROCEDURE — 1123F ACP DISCUSS/DSCN MKR DOCD: CPT | Performed by: INTERNAL MEDICINE

## 2024-09-25 PROCEDURE — G8427 DOCREV CUR MEDS BY ELIG CLIN: HCPCS | Performed by: INTERNAL MEDICINE

## 2024-09-25 PROCEDURE — G8419 CALC BMI OUT NRM PARAM NOF/U: HCPCS | Performed by: INTERNAL MEDICINE

## 2024-09-25 PROCEDURE — 99213 OFFICE O/P EST LOW 20 MIN: CPT | Performed by: INTERNAL MEDICINE

## 2024-11-13 ENCOUNTER — APPOINTMENT (RX ONLY)
Dept: URBAN - METROPOLITAN AREA CLINIC 25 | Facility: CLINIC | Age: 79
Setting detail: DERMATOLOGY
End: 2024-11-13

## 2024-11-13 DIAGNOSIS — D485 NEOPLASM OF UNCERTAIN BEHAVIOR OF SKIN: ICD-10-CM

## 2024-11-13 DIAGNOSIS — L73.8 OTHER SPECIFIED FOLLICULAR DISORDERS: ICD-10-CM

## 2024-11-13 DIAGNOSIS — D18.0 HEMANGIOMA: ICD-10-CM

## 2024-11-13 DIAGNOSIS — L57.0 ACTINIC KERATOSIS: ICD-10-CM

## 2024-11-13 DIAGNOSIS — L23.9 ALLERGIC CONTACT DERMATITIS, UNSPECIFIED CAUSE: ICD-10-CM

## 2024-11-13 DIAGNOSIS — L82.1 OTHER SEBORRHEIC KERATOSIS: ICD-10-CM

## 2024-11-13 DIAGNOSIS — L72.0 EPIDERMAL CYST: ICD-10-CM

## 2024-11-13 DIAGNOSIS — L57.8 OTHER SKIN CHANGES DUE TO CHRONIC EXPOSURE TO NONIONIZING RADIATION: ICD-10-CM

## 2024-11-13 PROBLEM — D18.01 HEMANGIOMA OF SKIN AND SUBCUTANEOUS TISSUE: Status: ACTIVE | Noted: 2024-11-13

## 2024-11-13 PROBLEM — D48.5 NEOPLASM OF UNCERTAIN BEHAVIOR OF SKIN: Status: ACTIVE | Noted: 2024-11-13

## 2024-11-13 PROCEDURE — 17003 DESTRUCT PREMALG LES 2-14: CPT

## 2024-11-13 PROCEDURE — ? COUNSELING

## 2024-11-13 PROCEDURE — ? BIOPSY BY SHAVE METHOD

## 2024-11-13 PROCEDURE — 17000 DESTRUCT PREMALG LESION: CPT | Mod: 59

## 2024-11-13 PROCEDURE — 99213 OFFICE O/P EST LOW 20 MIN: CPT | Mod: 25

## 2024-11-13 PROCEDURE — ? PRESCRIPTION

## 2024-11-13 PROCEDURE — ? LIQUID NITROGEN

## 2024-11-13 PROCEDURE — 11102 TANGNTL BX SKIN SINGLE LES: CPT

## 2024-11-13 RX ORDER — MOMETASONE FUROATE 1 MG/ML
SOLUTION TOPICAL
Qty: 60 | Refills: 0 | Status: ERX | COMMUNITY
Start: 2024-11-13

## 2024-11-13 RX ADMIN — MOMETASONE FUROATE: 1 SOLUTION TOPICAL at 00:00

## 2024-11-13 ASSESSMENT — LOCATION SIMPLE DESCRIPTION DERM
LOCATION SIMPLE: LEFT CHEEK
LOCATION SIMPLE: ABDOMEN
LOCATION SIMPLE: LEFT FOREARM
LOCATION SIMPLE: RIGHT FOREARM
LOCATION SIMPLE: RIGHT LOWER BACK
LOCATION SIMPLE: NOSE
LOCATION SIMPLE: LEFT FOREHEAD
LOCATION SIMPLE: POSTERIOR NECK
LOCATION SIMPLE: RIGHT FOREHEAD
LOCATION SIMPLE: UPPER BACK
LOCATION SIMPLE: CHEST

## 2024-11-13 ASSESSMENT — LOCATION DETAILED DESCRIPTION DERM
LOCATION DETAILED: EPIGASTRIC SKIN
LOCATION DETAILED: LEFT MEDIAL MALAR CHEEK
LOCATION DETAILED: RIGHT PROXIMAL DORSAL FOREARM
LOCATION DETAILED: INFERIOR THORACIC SPINE
LOCATION DETAILED: LEFT SUPERIOR FOREHEAD
LOCATION DETAILED: NASAL DORSUM
LOCATION DETAILED: LEFT PROXIMAL DORSAL FOREARM
LOCATION DETAILED: LEFT PROXIMAL RADIAL DORSAL FOREARM
LOCATION DETAILED: RIGHT FOREHEAD
LOCATION DETAILED: RIGHT SUPERIOR MEDIAL MIDBACK
LOCATION DETAILED: MID POSTERIOR NECK
LOCATION DETAILED: LEFT MEDIAL INFERIOR CHEST
LOCATION DETAILED: LEFT CENTRAL MALAR CHEEK

## 2024-11-13 ASSESSMENT — LOCATION ZONE DERM
LOCATION ZONE: TRUNK
LOCATION ZONE: NOSE
LOCATION ZONE: FACE
LOCATION ZONE: NECK
LOCATION ZONE: ARM

## 2024-11-13 NOTE — PROCEDURE: BIOPSY BY SHAVE METHOD
Detail Level: Detailed
Depth Of Biopsy: dermis
Was A Bandage Applied: Yes
Size Of Lesion In Cm: 0
Biopsy Type: H and E
Biopsy Method: Dermablade
Anesthesia Type: 1% lidocaine with epinephrine
Anesthesia Volume In Cc: 0.5
Hemostasis: Drysol
Wound Care: Petrolatum
Dressing: bandage
Destruction After The Procedure: No
Type Of Destruction Used: Curettage
Curettage Text: The wound bed was treated with curettage after the biopsy was performed.
Cryotherapy Text: The wound bed was treated with cryotherapy after the biopsy was performed.
Electrodesiccation Text: The wound bed was treated with electrodesiccation after the biopsy was performed.
Electrodesiccation And Curettage Text: The wound bed was treated with electrodesiccation and curettage after the biopsy was performed.
Silver Nitrate Text: The wound bed was treated with silver nitrate after the biopsy was performed.
Lab: [id number]
Lab Facility: [id number]
Consent: Written consent was obtained and risks were reviewed including but not limited to scarring, infection, bleeding, scabbing, incomplete removal, nerve damage and allergy to anesthesia.
Post-Care Instructions: I reviewed with the patient in detail post-care instructions. Patient is to keep the biopsy site dry overnight, and then apply bacitracin twice daily until healed. Patient may apply hydrogen peroxide soaks to remove any crusting.
Notification Instructions: Patient will be notified of biopsy results. However, patient instructed to call the office if not contacted within 2 weeks.
Billing Type: Third-Party Bill
Information: Selecting Yes will display possible errors in your note based on the variables you have selected. This validation is only offered as a suggestion for you. PLEASE NOTE THAT THE VALIDATION TEXT WILL BE REMOVED WHEN YOU FINALIZE YOUR NOTE. IF YOU WANT TO FAX A PRELIMINARY NOTE YOU WILL NEED TO TOGGLE THIS TO 'NO' IF YOU DO NOT WANT IT IN YOUR FAXED NOTE.

## 2025-01-31 ENCOUNTER — TELEPHONE (OUTPATIENT)
Dept: ONCOLOGY | Age: 80
End: 2025-01-31

## 2025-01-31 ENCOUNTER — TELEPHONE (OUTPATIENT)
Dept: PULMONOLOGY | Age: 80
End: 2025-01-31

## 2025-01-31 NOTE — TELEPHONE ENCOUNTER
Pt returning call from Hoda. Pt available until around 3 pm today.      [phone] or [phone]

## 2025-01-31 NOTE — TELEPHONE ENCOUNTER
LVM for the patient to return call     Return in about 6 months (around 3/25/2025).       WT 1/31/25

## 2025-02-18 ASSESSMENT — ENCOUNTER SYMPTOMS
SORE THROAT: 0
SHORTNESS OF BREATH: 0
ABDOMINAL PAIN: 0
EYE PROBLEMS: 0
COUGH: 0
DIARRHEA: 0
NAUSEA: 0
BLOOD IN STOOL: 0
SCLERAL ICTERUS: 0
ABDOMINAL DISTENTION: 0
HEMOPTYSIS: 0
CONSTIPATION: 0
BACK PAIN: 0
VOMITING: 0

## 2025-02-18 NOTE — PROGRESS NOTES
DIAGNOSIS:     THYROID, RIGHT NODULE, FINE NEEDLE ASPIRATION:      . BETHESDA CATEGORY 2 (TWO).      . CONSISTENT WITH A BENIGN THYROID NODULE      Imaging   11/12/2018  PET/CT ([place])  IMPRESSION:     1. High suspicion for prostate carcinoma with neuroendocrine features   particularly given reported strong family history of prostate carcinoma.   Recommend prostate MRI for further evaluation.   Krenning score:  3   2. Small foci of right rib activity indeterminate for metastases, suspect more   likely related to the aforementioned prostate findings as opposed to the   patient's history of bronchial carcinoid.     11/14/2018 CT Chest ([place])  IMPRESSION:   1. Interval PO changes of a left lower lobectomy.   2. No rib abnormalities to correspond to the areas of uptake on the PET/CT.   However, this is likely due to differences in sensitivity between the   techniques  10/25/2019 CT Chest/abdomen/pelvis ([facility] [place])  IMPRESSION:   1. Increase in size and number of multiple mesenteric lymph nodes with a general haziness to the small bowel mesentery. The findings are nonspecific. Metastatic disease is not excluded. Could consider further evaluation with PET/CT to evaluate the metabolic activity of these lymph nodes.   2. Postsurgical changes in keeping with prior left upper lobectomy. No findings to suspect disease recurrence of the chest.   3. Cholelithiasis   4. Right hepatic lobe cyst.   5. Enlarged prostate gland.   6. Sigmoid colon diverticulosis.      11/5/2019 PET/CT NETSPOT ([facility])  Addendum: Of all of the patient's prior studies loaded to the PACS only   the CT abdomen of 4/15/2019 completely image the mesenteric lymph nodes in the abdomen.   The enlarged node that is mildly tracer avid in the small bowel mesentery   on this study measures larger than it did.  On the prior study this   measured up to 9 mm compared to 13 mm now.   Probably tissue sampling/resection of this would be needed for

## 2025-02-19 ENCOUNTER — OFFICE VISIT (OUTPATIENT)
Dept: ONCOLOGY | Age: 80
End: 2025-02-19
Payer: MEDICARE

## 2025-02-19 VITALS
WEIGHT: 157.5 LBS | SYSTOLIC BLOOD PRESSURE: 134 MMHG | TEMPERATURE: 98.8 F | DIASTOLIC BLOOD PRESSURE: 71 MMHG | OXYGEN SATURATION: 99 % | BODY MASS INDEX: 26.24 KG/M2 | HEIGHT: 65 IN | HEART RATE: 72 BPM

## 2025-02-19 DIAGNOSIS — Z90.2 S/P LOBECTOMY OF LUNG: ICD-10-CM

## 2025-02-19 DIAGNOSIS — C82.98 FOLLICULAR LYMPHOMA OF LYMPH NODES OF MULTIPLE REGIONS, UNSPECIFIED FOLLICULAR LYMPHOMA TYPE (HCC): Primary | ICD-10-CM

## 2025-02-19 PROCEDURE — 1036F TOBACCO NON-USER: CPT | Performed by: INTERNAL MEDICINE

## 2025-02-19 PROCEDURE — G8427 DOCREV CUR MEDS BY ELIG CLIN: HCPCS | Performed by: INTERNAL MEDICINE

## 2025-02-19 PROCEDURE — 1123F ACP DISCUSS/DSCN MKR DOCD: CPT | Performed by: INTERNAL MEDICINE

## 2025-02-19 PROCEDURE — 99213 OFFICE O/P EST LOW 20 MIN: CPT | Performed by: INTERNAL MEDICINE

## 2025-02-19 PROCEDURE — 1159F MED LIST DOCD IN RCRD: CPT | Performed by: INTERNAL MEDICINE

## 2025-02-19 PROCEDURE — G8419 CALC BMI OUT NRM PARAM NOF/U: HCPCS | Performed by: INTERNAL MEDICINE

## 2025-02-19 NOTE — PATIENT INSTRUCTIONS
Patient Information from Today's Visit    The members of your Oncology Medical Home are listed below:    Physician Provider: Emely Dumont, Medical Oncologist  Registered Nurse: Chasidy OBANDO RN  Navigator: N/A  Medical Assistant: Darshana RODRIGUEZ MA  : Hoda TOURE   Supportive Care Services: Mohan MANN LMSW    Diagnosis: Lymphoma      Follow Up Instructions: Lymphoma    Labs reviewed.  Symptoms reviewed.      Treatment Summary has been discussed and given to patient:N/A      Current Labs: Done at outside facility.            Please refer to After Visit Summary or MyChart for upcoming appointment information. Please call our office for rescheduling needs at least 24 hours before your scheduled appointment time.If you have any questions regarding your upcoming schedule please reach out to your care team through [employer] or call [phone].     Please notify your assigned Nurse Navigator of any unplanned hospital admissions or Emergency Room visits within 24 hours of discharge.    -------------------------------------------------------------------------------------------------------------------  Please call our office at [phone] if you have any  of the following symptoms:   Fever of 100.5 or greater  Chills  Shortness of breath  Swelling or pain in one leg    After office hours an answering service is available and will contact a provider for emergencies or if you are experiencing any of the above symptoms.        CHASIDY SIMMONS RN

## 2025-05-01 ENCOUNTER — OFFICE VISIT (OUTPATIENT)
Dept: PULMONOLOGY | Age: 80
End: 2025-05-01
Payer: MEDICARE

## 2025-05-01 VITALS
RESPIRATION RATE: 14 BRPM | BODY MASS INDEX: 24.75 KG/M2 | HEART RATE: 88 BPM | HEIGHT: 66 IN | DIASTOLIC BLOOD PRESSURE: 69 MMHG | SYSTOLIC BLOOD PRESSURE: 124 MMHG | WEIGHT: 154 LBS | OXYGEN SATURATION: 99 %

## 2025-05-01 DIAGNOSIS — C49.9 LIPOSARCOMA (HCC): ICD-10-CM

## 2025-05-01 DIAGNOSIS — Z90.2 S/P LOBECTOMY OF LUNG: ICD-10-CM

## 2025-05-01 DIAGNOSIS — C7A.090 MALIGNANT CARCINOID TUMOR OF THE BRONCHUS AND LUNG (HCC): ICD-10-CM

## 2025-05-01 DIAGNOSIS — C82.90 FOLLICULAR LYMPHOMA, UNSPECIFIED FOLLICULAR LYMPHOMA TYPE, UNSPECIFIED BODY REGION (HCC): Primary | ICD-10-CM

## 2025-05-01 PROCEDURE — 1123F ACP DISCUSS/DSCN MKR DOCD: CPT | Performed by: INTERNAL MEDICINE

## 2025-05-01 PROCEDURE — 99213 OFFICE O/P EST LOW 20 MIN: CPT | Performed by: INTERNAL MEDICINE

## 2025-05-01 PROCEDURE — G8420 CALC BMI NORM PARAMETERS: HCPCS | Performed by: INTERNAL MEDICINE

## 2025-05-01 PROCEDURE — 1159F MED LIST DOCD IN RCRD: CPT | Performed by: INTERNAL MEDICINE

## 2025-05-01 PROCEDURE — 1036F TOBACCO NON-USER: CPT | Performed by: INTERNAL MEDICINE

## 2025-05-01 PROCEDURE — G8427 DOCREV CUR MEDS BY ELIG CLIN: HCPCS | Performed by: INTERNAL MEDICINE

## 2025-05-01 NOTE — PROGRESS NOTES
Name:  Gume Flores  YOB: 1945   MRN: [id number]      Office Visit: 5/1/2025       Assessment & Plan      LOV 9/25/2024 with Ronnie-JAMIA    (Medical Decision Making)    Impression: 79 y.o. male     03/25/24   Diagnosis Orders   1. Follicular lymphoma, unspecified follicular lymphoma type, unspecified body region (HCC)        2. History of malignant carcinoid tumor        3. S/P lobectomy of lung        4. 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 Liposarcoma (HCC)          The patient is doing well from his carcinoid standpoint, he continues to follow with oncology at [facility], he had successful removal of a 12 cm liposarcoma from his pharynx without any side effects, he is undergoing therapy for follicular lymphoma with good result and he is under continued prophylaxis for pneumocystis jiroveci pneumonia.  The patient would like to continue to follow with us and will come back in 6 months therapy to discuss his progress.  Any issues related to recurrence of any of his cancers are per patient's wishes going to be dealt with at [facility] which knows him well and with whom he is very well-established given the complexity of his case.      1. Follicular lymphoma, unspecified follicular lymphoma type, unspecified body region (HCC)  In remission, no complaints, recent lab work was okay per oncology, due to see oncology at [facility] in December with further lab work and imaging.  He recently had COVID in August from which he is completely recovered did not require hospitalization or supplemental oxygen therapy he did not even require Paxlovid.    2. S/P lobectomy of lung  Recovered quite well he has no complaints he is continuing follow-up with oncology at [facility]    3. 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

## 2025-05-14 DIAGNOSIS — R97.20 ELEVATED PSA: ICD-10-CM

## 2025-05-14 LAB — PSA SERPL-MCNC: 1.9 NG/ML (ref 0–4)

## 2025-05-20 ENCOUNTER — OFFICE VISIT (OUTPATIENT)
Dept: UROLOGY | Age: 80
End: 2025-05-20
Payer: MEDICARE

## 2025-05-20 ENCOUNTER — TELEPHONE (OUTPATIENT)
Dept: UROLOGY | Age: 80
End: 2025-05-20

## 2025-05-20 DIAGNOSIS — R35.0 URINARY FREQUENCY: ICD-10-CM

## 2025-05-20 DIAGNOSIS — N40.1 BENIGN PROSTATIC HYPERPLASIA WITH LOWER URINARY TRACT SYMPTOMS, SYMPTOM DETAILS UNSPECIFIED: ICD-10-CM

## 2025-05-20 DIAGNOSIS — N52.9 ERECTILE DYSFUNCTION, UNSPECIFIED ERECTILE DYSFUNCTION TYPE: ICD-10-CM

## 2025-05-20 DIAGNOSIS — R97.20 ELEVATED PSA: Primary | ICD-10-CM

## 2025-05-20 LAB
BILIRUBIN, URINE, POC: NEGATIVE
BLOOD URINE, POC: NEGATIVE
GLUCOSE URINE, POC: NEGATIVE MG/DL
KETONES, URINE, POC: NEGATIVE MG/DL
LEUKOCYTE ESTERASE, URINE, POC: NEGATIVE
NITRITE, URINE, POC: NEGATIVE
PH, URINE, POC: 7 (ref 4.6–8)
PROTEIN,URINE, POC: NEGATIVE MG/DL
SPECIFIC GRAVITY, URINE, POC: 1.01 (ref 1–1.03)
URINALYSIS CLARITY, POC: NORMAL
URINALYSIS COLOR, POC: NORMAL
UROBILINOGEN, POC: NORMAL MG/DL

## 2025-05-20 PROCEDURE — 1036F TOBACCO NON-USER: CPT | Performed by: UROLOGY

## 2025-05-20 PROCEDURE — 81003 URINALYSIS AUTO W/O SCOPE: CPT | Performed by: UROLOGY

## 2025-05-20 PROCEDURE — G8427 DOCREV CUR MEDS BY ELIG CLIN: HCPCS | Performed by: UROLOGY

## 2025-05-20 PROCEDURE — 1159F MED LIST DOCD IN RCRD: CPT | Performed by: UROLOGY

## 2025-05-20 PROCEDURE — 1123F ACP DISCUSS/DSCN MKR DOCD: CPT | Performed by: UROLOGY

## 2025-05-20 PROCEDURE — 99214 OFFICE O/P EST MOD 30 MIN: CPT | Performed by: UROLOGY

## 2025-05-20 PROCEDURE — G8420 CALC BMI NORM PARAMETERS: HCPCS | Performed by: UROLOGY

## 2025-05-20 PROCEDURE — 1160F RVW MEDS BY RX/DR IN RCRD: CPT | Performed by: UROLOGY

## 2025-05-20 RX ORDER — ALFUZOSIN HYDROCHLORIDE 10 MG/1
10 TABLET, EXTENDED RELEASE ORAL DAILY
Qty: 90 TABLET | Refills: 3 | Status: SHIPPED | OUTPATIENT
Start: 2025-05-20

## 2025-05-20 RX ORDER — TADALAFIL 5 MG/1
5 TABLET ORAL DAILY
Qty: 90 TABLET | Refills: 3 | Status: SHIPPED | OUTPATIENT
Start: 2025-05-20

## 2025-05-20 RX ORDER — DUTASTERIDE 0.5 MG/1
0.5 CAPSULE, LIQUID FILLED ORAL DAILY
Qty: 90 CAPSULE | Refills: 3 | Status: SHIPPED | OUTPATIENT
Start: 2025-05-20

## 2025-05-20 RX ORDER — OXYBUTYNIN CHLORIDE 5 MG/1
5 TABLET ORAL 2 TIMES DAILY PRN
Qty: 60 TABLET | Refills: 11 | Status: SHIPPED | OUTPATIENT
Start: 2025-05-20

## 2025-05-20 ASSESSMENT — ENCOUNTER SYMPTOMS: BACK PAIN: 0

## 2025-05-20 NOTE — PROGRESS NOTES
frequency/urgency) 60 tablet 11    ascorbic acid (VITAMIN C) 250 MG tablet Take 1 tablet by mouth daily      traMADol-acetaminophen (ULTRACET) 37.5-325 MG per tablet       Ubiquinol 100 MG CAPS       Omega-3 Fatty Acids (FISH OIL) 1000 MG capsule Take 1 tablet by mouth daily      CPAP Machine MISC       zolpidem (AMBIEN CR) 6.25 MG extended release tablet       fluticasone (FLONASE) 50 MCG/ACT nasal spray 2 sprays by Nasal route daily as needed      pseudoephedrine (SUDAFED) 30 MG tablet Take by mouth every 4 hours as needed      simvastatin (ZOCOR) 40 MG tablet Take by mouth       Current Facility-Administered Medications   Medication Dose Route Frequency Provider Last Rate Last Admin    albuterol (PROVENTIL) (2.5 MG/3ML) 0.083% nebulizer solution 2.5 mg  2.5 mg Nebulization Once Jay Parra MD        albuterol (PROVENTIL) (2.5 MG/3ML) 0.083% nebulizer solution 2.5 mg  2.5 mg Nebulization Once Jay Parra MD         Allergies   Allergen Reactions    Oxycodone Itching     Flushed and itching with Tylox formulation (oxy-apap) but tolerates acetaminophen without oxycodone    Sulfa Antibiotics Itching and Rash     Other Reaction(s): itching    Acetaminophen     Cat Dander      SINUS CONGESTION    Dog Epithelium (Canis Lupus Familiaris)      SINUS CONGESTN    Gramineae Pollens      SINUS CONGESTION    Oxycodone Hcl     Oxycodone-Acetaminophen Itching    Tyloxapol Other (See Comments)    Sulfasalazine Other (See Comments), Itching and Rash     Social History     Socioeconomic History    Marital status:      Spouse name: Not on file    Number of children: Not on file    Years of education: Not on file    Highest education level: Not on file   Occupational History    Not on file   Tobacco Use    Smoking status: Former     Current packs/day: 0.00     Average packs/day: 0.3 packs/day for 1 year (0.3 ttl pk-yrs)     Types: Cigarettes     Start date: 9/4/1966     Quit date: 6/1/1967     Years since quitting:

## 2025-06-11 ENCOUNTER — TELEPHONE (OUTPATIENT)
Dept: ONCOLOGY | Age: 80
End: 2025-06-11

## 2025-07-01 RX ORDER — TOLTERODINE 4 MG/1
4 CAPSULE, EXTENDED RELEASE ORAL DAILY
Qty: 90 CAPSULE | Refills: 3 | Status: SHIPPED | OUTPATIENT
Start: 2025-07-01

## 2025-07-01 NOTE — TELEPHONE ENCOUNTER
Pt advised. Teed up. AL         Axel up detrol LA 4 mg once daily and we'll send it and see if it is affordable for him.

## 2025-08-18 ENCOUNTER — HOSPITAL ENCOUNTER (OUTPATIENT)
Dept: LAB | Age: 80
Discharge: HOME | End: 2025-08-18
Payer: MEDICARE

## 2025-08-18 DIAGNOSIS — C82.98 FOLLICULAR LYMPHOMA OF LYMPH NODES OF MULTIPLE REGIONS, UNSPECIFIED FOLLICULAR LYMPHOMA TYPE (HCC): ICD-10-CM

## 2025-08-18 LAB
ALBUMIN SERPL-MCNC: 3.8 G/DL (ref 3.2–4.6)
ALBUMIN/GLOB SERPL: 1.4 (ref 1–1.9)
ALP SERPL-CCNC: 57 U/L (ref 40–129)
ALT SERPL-CCNC: 7 U/L (ref 8–55)
ANION GAP SERPL CALC-SCNC: 11 MMOL/L (ref 7–16)
AST SERPL-CCNC: 21 U/L (ref 15–37)
BASOPHILS # BLD: 0.04 K/UL (ref 0–0.2)
BASOPHILS NFR BLD: 0.7 % (ref 0–2)
BILIRUB SERPL-MCNC: 0.5 MG/DL (ref 0–1.2)
BUN SERPL-MCNC: 19 MG/DL (ref 8–23)
CALCIUM SERPL-MCNC: 9.9 MG/DL (ref 8.8–10.2)
CHLORIDE SERPL-SCNC: 106 MMOL/L (ref 98–107)
CO2 SERPL-SCNC: 24 MMOL/L (ref 20–29)
CREAT SERPL-MCNC: 0.98 MG/DL (ref 0.8–1.3)
DIFFERENTIAL METHOD BLD: ABNORMAL
EOSINOPHIL # BLD: 0.15 K/UL (ref 0–0.8)
EOSINOPHIL NFR BLD: 2.6 % (ref 0.5–7.8)
ERYTHROCYTE [DISTWIDTH] IN BLOOD BY AUTOMATED COUNT: 12.9 % (ref 11.9–14.6)
GLOBULIN SER CALC-MCNC: 2.8 G/DL (ref 2.3–3.5)
GLUCOSE SERPL-MCNC: 89 MG/DL (ref 70–99)
HCT VFR BLD AUTO: 36.4 % (ref 41.1–50.3)
HGB BLD-MCNC: 12.4 G/DL (ref 13.6–17.2)
IMM GRANULOCYTES # BLD AUTO: 0.02 K/UL (ref 0–0.5)
IMM GRANULOCYTES NFR BLD AUTO: 0.4 % (ref 0–5)
LDH SERPL L TO P-CCNC: 163 U/L (ref 127–281)
LYMPHOCYTES # BLD: 0.78 K/UL (ref 0.5–4.6)
LYMPHOCYTES NFR BLD: 13.8 % (ref 13–44)
MCH RBC QN AUTO: 32 PG (ref 26.1–32.9)
MCHC RBC AUTO-ENTMCNC: 34.1 G/DL (ref 31.4–35)
MCV RBC AUTO: 94.1 FL (ref 82–102)
MONOCYTES # BLD: 0.5 K/UL (ref 0.1–1.3)
MONOCYTES NFR BLD: 8.8 % (ref 4–12)
NEUTS SEG # BLD: 4.18 K/UL (ref 1.7–8.2)
NEUTS SEG NFR BLD: 73.7 % (ref 43–78)
NRBC # BLD: 0 K/UL (ref 0–0.2)
PLATELET # BLD AUTO: 112 K/UL (ref 150–450)
PMV BLD AUTO: 9.2 FL (ref 9.4–12.3)
POTASSIUM SERPL-SCNC: 4.2 MMOL/L (ref 3.5–5.1)
PROT SERPL-MCNC: 6.6 G/DL (ref 6.3–8.2)
RBC # BLD AUTO: 3.87 M/UL (ref 4.23–5.6)
SODIUM SERPL-SCNC: 141 MMOL/L (ref 136–145)
WBC # BLD AUTO: 5.7 K/UL (ref 4.3–11.1)

## 2025-08-18 PROCEDURE — 83615 LACTATE (LD) (LDH) ENZYME: CPT

## 2025-08-18 PROCEDURE — 80053 COMPREHEN METABOLIC PANEL: CPT

## 2025-08-18 PROCEDURE — 36415 COLL VENOUS BLD VENIPUNCTURE: CPT

## 2025-08-18 PROCEDURE — 85025 COMPLETE CBC W/AUTO DIFF WBC: CPT

## 2025-08-21 ENCOUNTER — OFFICE VISIT (OUTPATIENT)
Dept: ONCOLOGY | Age: 80
End: 2025-08-21
Payer: MEDICARE

## 2025-08-21 VITALS
DIASTOLIC BLOOD PRESSURE: 62 MMHG | BODY MASS INDEX: 25.16 KG/M2 | HEIGHT: 65 IN | SYSTOLIC BLOOD PRESSURE: 100 MMHG | TEMPERATURE: 98.9 F | WEIGHT: 151 LBS | OXYGEN SATURATION: 97 % | RESPIRATION RATE: 16 BRPM | HEART RATE: 77 BPM

## 2025-08-21 DIAGNOSIS — Z85.831 HISTORY OF SARCOMA: ICD-10-CM

## 2025-08-21 DIAGNOSIS — Z85.9 HISTORY OF MALIGNANT CARCINOID TUMOR: ICD-10-CM

## 2025-08-21 DIAGNOSIS — Z85.72 HISTORY OF FOLLICULAR LYMPHOMA: Primary | ICD-10-CM

## 2025-08-21 PROCEDURE — 1036F TOBACCO NON-USER: CPT | Performed by: INTERNAL MEDICINE

## 2025-08-21 PROCEDURE — 99213 OFFICE O/P EST LOW 20 MIN: CPT | Performed by: INTERNAL MEDICINE

## 2025-08-21 PROCEDURE — 1123F ACP DISCUSS/DSCN MKR DOCD: CPT | Performed by: INTERNAL MEDICINE

## 2025-08-21 PROCEDURE — G8427 DOCREV CUR MEDS BY ELIG CLIN: HCPCS | Performed by: INTERNAL MEDICINE

## 2025-08-21 PROCEDURE — G8419 CALC BMI OUT NRM PARAM NOF/U: HCPCS | Performed by: INTERNAL MEDICINE

## 2025-08-21 PROCEDURE — 1126F AMNT PAIN NOTED NONE PRSNT: CPT | Performed by: INTERNAL MEDICINE

## 2025-08-21 ASSESSMENT — PATIENT HEALTH QUESTIONNAIRE - PHQ9
SUM OF ALL RESPONSES TO PHQ QUESTIONS 1-9: 0
SUM OF ALL RESPONSES TO PHQ QUESTIONS 1-9: 0
1. LITTLE INTEREST OR PLEASURE IN DOING THINGS: NOT AT ALL
SUM OF ALL RESPONSES TO PHQ QUESTIONS 1-9: 0
2. FEELING DOWN, DEPRESSED OR HOPELESS: NOT AT ALL
SUM OF ALL RESPONSES TO PHQ QUESTIONS 1-9: 0

## 2025-09-01 PROBLEM — Z85.72 HISTORY OF FOLLICULAR LYMPHOMA: Status: ACTIVE | Noted: 2025-09-01

## 2025-09-01 PROBLEM — Z85.9 HISTORY OF MALIGNANT CARCINOID TUMOR: Status: ACTIVE | Noted: 2025-09-01

## 2025-09-01 PROBLEM — Z85.831 HISTORY OF SARCOMA: Status: ACTIVE | Noted: 2025-09-01

## (undated) DEVICE — (D)SYR 10ML 1/5ML GRAD NSAF -- PKGING CHANGE USE ITEM 338027

## (undated) DEVICE — KIT CATH 45DEG FIRM TIP EXT WRK CHN LOCATABLE GUID EDGE [SDK3450OLYMPUS] [SUPERDIMENSION INC]

## (undated) DEVICE — BRONCHOSCOPY PACK: Brand: MEDLINE INDUSTRIES, INC.

## (undated) DEVICE — PATCH SENS PT FOR ELECTROMAGNETIC NAVIGATION BRONCHSCP SYS

## (undated) DEVICE — CANNULA NSL ORAL AD FOR CAPNOFLEX CO2 O2 AIRLFE

## (undated) DEVICE — BRUSH CYTO L120MM DIA1.7MM SHARPENED NDL TIP FWD FACING

## (undated) DEVICE — KENDALL RADIOLUCENT FOAM MONITORING ELECTRODE RECTANGULAR SHAPE: Brand: KENDALL

## (undated) DEVICE — SYR 50ML SLIP TIP NSAF LF STRL --

## (undated) DEVICE — SINGLE USE BIOPSY VALVE MAJ-210: Brand: SINGLE USE BIOPSY VALVE (STERILE)

## (undated) DEVICE — SINGLE USE ASPIRATION NEEDLE: Brand: SINGLE USE ASPIRATION NEEDLE

## (undated) DEVICE — SET EXTN L6IN W/ S STL CLMP

## (undated) DEVICE — SINGLE USE SUCTION VALVE MAJ-209: Brand: SINGLE USE SUCTION VALVE (STERILE)

## (undated) DEVICE — Device: Brand: BALLOON

## (undated) DEVICE — SOL INJ SOD CL0.9% 10ML PREFIL --

## (undated) DEVICE — FORCEPS BX WRK L110MM CHN L2MM DIA1.7MM SUPERDIMENSION